# Patient Record
Sex: FEMALE | Race: WHITE | NOT HISPANIC OR LATINO | Employment: UNEMPLOYED | URBAN - METROPOLITAN AREA
[De-identification: names, ages, dates, MRNs, and addresses within clinical notes are randomized per-mention and may not be internally consistent; named-entity substitution may affect disease eponyms.]

---

## 2017-01-01 ENCOUNTER — APPOINTMENT (OUTPATIENT)
Dept: RADIOLOGY | Facility: MEDICAL CENTER | Age: 47
DRG: 023 | End: 2017-01-01
Attending: INTERNAL MEDICINE

## 2017-01-01 ENCOUNTER — APPOINTMENT (OUTPATIENT)
Dept: RADIOLOGY | Facility: MEDICAL CENTER | Age: 47
DRG: 023 | End: 2017-01-01
Attending: EMERGENCY MEDICINE

## 2017-01-01 ENCOUNTER — APPOINTMENT (OUTPATIENT)
Dept: RADIOLOGY | Facility: MEDICAL CENTER | Age: 47
DRG: 023 | End: 2017-01-01
Attending: SURGERY

## 2017-01-01 ENCOUNTER — HOSPITAL ENCOUNTER (INPATIENT)
Facility: MEDICAL CENTER | Age: 47
LOS: 3 days | DRG: 023 | End: 2017-04-22
Attending: EMERGENCY MEDICINE | Admitting: HOSPITALIST

## 2017-01-01 ENCOUNTER — RESOLUTE PROFESSIONAL BILLING HOSPITAL PROF FEE (OUTPATIENT)
Dept: HOSPITALIST | Facility: MEDICAL CENTER | Age: 47
End: 2017-01-01

## 2017-01-01 VITALS
BODY MASS INDEX: 20.8 KG/M2 | OXYGEN SATURATION: 2 % | HEART RATE: 153 BPM | HEIGHT: 67 IN | WEIGHT: 132.5 LBS | TEMPERATURE: 98 F

## 2017-01-01 DIAGNOSIS — I61.9 LEFT-SIDED NONTRAUMATIC INTRACEREBRAL HEMORRHAGE, UNSPECIFIED CEREBRAL LOCATION (HCC): ICD-10-CM

## 2017-01-01 LAB
ABO GROUP BLD: NORMAL
ALBUMIN SERPL BCP-MCNC: 3.4 G/DL (ref 3.2–4.9)
ALBUMIN SERPL BCP-MCNC: 3.7 G/DL (ref 3.2–4.9)
ALBUMIN/GLOB SERPL: 1.4 G/DL
ALBUMIN/GLOB SERPL: 1.4 G/DL
ALP SERPL-CCNC: 107 U/L (ref 30–99)
ALP SERPL-CCNC: 83 U/L (ref 30–99)
ALT SERPL-CCNC: 10 U/L (ref 2–50)
ALT SERPL-CCNC: 13 U/L (ref 2–50)
AMPHET UR QL SCN: POSITIVE
AMPHET UR QL SCN: POSITIVE
ANION GAP SERPL CALC-SCNC: 10 MMOL/L (ref 0–11.9)
ANION GAP SERPL CALC-SCNC: 10 MMOL/L (ref 0–11.9)
ANION GAP SERPL CALC-SCNC: 5 MMOL/L (ref 0–11.9)
ANION GAP SERPL CALC-SCNC: 6 MMOL/L (ref 0–11.9)
ANION GAP SERPL CALC-SCNC: 7 MMOL/L (ref 0–11.9)
ANION GAP SERPL CALC-SCNC: 8 MMOL/L (ref 0–11.9)
APTT PPP: 28 SEC (ref 24.7–36)
AST SERPL-CCNC: 16 U/L (ref 12–45)
AST SERPL-CCNC: 34 U/L (ref 12–45)
BARBITURATES UR QL SCN: NEGATIVE
BARBITURATES UR QL SCN: NEGATIVE
BASE EXCESS BLDA CALC-SCNC: -1 MMOL/L (ref -4–3)
BASE EXCESS BLDA CALC-SCNC: -5 MMOL/L (ref -4–3)
BASE EXCESS BLDA CALC-SCNC: -5 MMOL/L (ref -4–3)
BASE EXCESS BLDA CALC-SCNC: -6 MMOL/L (ref -4–3)
BASE EXCESS BLDA CALC-SCNC: -8 MMOL/L (ref -4–3)
BASOPHILS # BLD AUTO: 0.3 % (ref 0–1.8)
BASOPHILS # BLD AUTO: 0.6 % (ref 0–1.8)
BASOPHILS # BLD: 0.03 K/UL (ref 0–0.12)
BASOPHILS # BLD: 0.09 K/UL (ref 0–0.12)
BENZODIAZ UR QL SCN: NEGATIVE
BENZODIAZ UR QL SCN: POSITIVE
BILIRUB SERPL-MCNC: 0.2 MG/DL (ref 0.1–1.5)
BILIRUB SERPL-MCNC: 0.3 MG/DL (ref 0.1–1.5)
BLD GP AB SCN SERPL QL: NORMAL
BLOOD CULTURE HOLD CXBCH: NORMAL
BODY TEMPERATURE: ABNORMAL DEGREES
BUN SERPL-MCNC: 11 MG/DL (ref 8–22)
BUN SERPL-MCNC: 12 MG/DL (ref 8–22)
BUN SERPL-MCNC: 5 MG/DL (ref 8–22)
BUN SERPL-MCNC: 6 MG/DL (ref 8–22)
BUN SERPL-MCNC: 9 MG/DL (ref 8–22)
BUN SERPL-MCNC: 9 MG/DL (ref 8–22)
BZE UR QL SCN: NEGATIVE
BZE UR QL SCN: NEGATIVE
CALCIUM SERPL-MCNC: 8 MG/DL (ref 8.5–10.5)
CALCIUM SERPL-MCNC: 8.5 MG/DL (ref 8.5–10.5)
CALCIUM SERPL-MCNC: 8.5 MG/DL (ref 8.5–10.5)
CALCIUM SERPL-MCNC: 8.6 MG/DL (ref 8.5–10.5)
CALCIUM SERPL-MCNC: 8.7 MG/DL (ref 8.5–10.5)
CALCIUM SERPL-MCNC: 8.7 MG/DL (ref 8.5–10.5)
CANNABINOIDS UR QL SCN: NEGATIVE
CANNABINOIDS UR QL SCN: NEGATIVE
CHLORIDE SERPL-SCNC: 111 MMOL/L (ref 96–112)
CHLORIDE SERPL-SCNC: 112 MMOL/L (ref 96–112)
CHLORIDE SERPL-SCNC: 114 MMOL/L (ref 96–112)
CHLORIDE SERPL-SCNC: 115 MMOL/L (ref 96–112)
CHLORIDE SERPL-SCNC: 116 MMOL/L (ref 96–112)
CHLORIDE SERPL-SCNC: 117 MMOL/L (ref 96–112)
CO2 BLDA-SCNC: 19 MMOL/L (ref 20–33)
CO2 BLDA-SCNC: 20 MMOL/L (ref 20–33)
CO2 BLDA-SCNC: 21 MMOL/L (ref 20–33)
CO2 BLDA-SCNC: 23 MMOL/L (ref 20–33)
CO2 BLDA-SCNC: 24 MMOL/L (ref 20–33)
CO2 SERPL-SCNC: 17 MMOL/L (ref 20–33)
CO2 SERPL-SCNC: 18 MMOL/L (ref 20–33)
CO2 SERPL-SCNC: 18 MMOL/L (ref 20–33)
CO2 SERPL-SCNC: 20 MMOL/L (ref 20–33)
CO2 SERPL-SCNC: 22 MMOL/L (ref 20–33)
CO2 SERPL-SCNC: 24 MMOL/L (ref 20–33)
CREAT SERPL-MCNC: 0.45 MG/DL (ref 0.5–1.4)
CREAT SERPL-MCNC: 0.49 MG/DL (ref 0.5–1.4)
CREAT SERPL-MCNC: 0.52 MG/DL (ref 0.5–1.4)
CREAT SERPL-MCNC: 0.56 MG/DL (ref 0.5–1.4)
CREAT SERPL-MCNC: 0.58 MG/DL (ref 0.5–1.4)
CREAT SERPL-MCNC: 0.7 MG/DL (ref 0.5–1.4)
EKG IMPRESSION: NORMAL
EOSINOPHIL # BLD AUTO: 0.02 K/UL (ref 0–0.51)
EOSINOPHIL # BLD AUTO: 0.17 K/UL (ref 0–0.51)
EOSINOPHIL NFR BLD: 0.2 % (ref 0–6.9)
EOSINOPHIL NFR BLD: 1.1 % (ref 0–6.9)
ERYTHROCYTE [DISTWIDTH] IN BLOOD BY AUTOMATED COUNT: 43.8 FL (ref 35.9–50)
ERYTHROCYTE [DISTWIDTH] IN BLOOD BY AUTOMATED COUNT: 44.8 FL (ref 35.9–50)
ERYTHROCYTE [DISTWIDTH] IN BLOOD BY AUTOMATED COUNT: 46.7 FL (ref 35.9–50)
ERYTHROCYTE [DISTWIDTH] IN BLOOD BY AUTOMATED COUNT: 46.7 FL (ref 35.9–50)
GFR SERPL CREATININE-BSD FRML MDRD: >60 ML/MIN/1.73 M 2
GLOBULIN SER CALC-MCNC: 2.5 G/DL (ref 1.9–3.5)
GLOBULIN SER CALC-MCNC: 2.7 G/DL (ref 1.9–3.5)
GLUCOSE BLD-MCNC: 111 MG/DL (ref 65–99)
GLUCOSE BLD-MCNC: 124 MG/DL (ref 65–99)
GLUCOSE SERPL-MCNC: 120 MG/DL (ref 65–99)
GLUCOSE SERPL-MCNC: 124 MG/DL (ref 65–99)
GLUCOSE SERPL-MCNC: 125 MG/DL (ref 65–99)
GLUCOSE SERPL-MCNC: 128 MG/DL (ref 65–99)
GLUCOSE SERPL-MCNC: 130 MG/DL (ref 65–99)
GLUCOSE SERPL-MCNC: 235 MG/DL (ref 65–99)
HCO3 BLDA-SCNC: 18.1 MMOL/L (ref 17–25)
HCO3 BLDA-SCNC: 18.7 MMOL/L (ref 17–25)
HCO3 BLDA-SCNC: 19.6 MMOL/L (ref 17–25)
HCO3 BLDA-SCNC: 22.2 MMOL/L (ref 17–25)
HCO3 BLDA-SCNC: 22.5 MMOL/L (ref 17–25)
HCT VFR BLD AUTO: 35.9 % (ref 37–47)
HCT VFR BLD AUTO: 38.4 % (ref 37–47)
HCT VFR BLD AUTO: 39.4 % (ref 37–47)
HCT VFR BLD AUTO: 40.7 % (ref 37–47)
HGB BLD-MCNC: 11.8 G/DL (ref 12–16)
HGB BLD-MCNC: 12.6 G/DL (ref 12–16)
HGB BLD-MCNC: 12.8 G/DL (ref 12–16)
HGB BLD-MCNC: 13.3 G/DL (ref 12–16)
IMM GRANULOCYTES # BLD AUTO: 0.04 K/UL (ref 0–0.11)
IMM GRANULOCYTES # BLD AUTO: 0.11 K/UL (ref 0–0.11)
IMM GRANULOCYTES NFR BLD AUTO: 0.4 % (ref 0–0.9)
IMM GRANULOCYTES NFR BLD AUTO: 0.7 % (ref 0–0.9)
INR PPP: 0.92 (ref 0.87–1.13)
LYMPHOCYTES # BLD AUTO: 1.16 K/UL (ref 1–4.8)
LYMPHOCYTES # BLD AUTO: 4.48 K/UL (ref 1–4.8)
LYMPHOCYTES NFR BLD: 10.2 % (ref 22–41)
LYMPHOCYTES NFR BLD: 29.3 % (ref 22–41)
MAGNESIUM SERPL-MCNC: 1.6 MG/DL (ref 1.5–2.5)
MAGNESIUM SERPL-MCNC: 1.9 MG/DL (ref 1.5–2.5)
MAGNESIUM SERPL-MCNC: 2.1 MG/DL (ref 1.5–2.5)
MCH RBC QN AUTO: 29.4 PG (ref 27–33)
MCH RBC QN AUTO: 29.5 PG (ref 27–33)
MCH RBC QN AUTO: 29.8 PG (ref 27–33)
MCH RBC QN AUTO: 29.8 PG (ref 27–33)
MCHC RBC AUTO-ENTMCNC: 32.5 G/DL (ref 33.6–35)
MCHC RBC AUTO-ENTMCNC: 32.7 G/DL (ref 33.6–35)
MCHC RBC AUTO-ENTMCNC: 32.8 G/DL (ref 33.6–35)
MCHC RBC AUTO-ENTMCNC: 32.9 G/DL (ref 33.6–35)
MCV RBC AUTO: 89.8 FL (ref 81.4–97.8)
MCV RBC AUTO: 89.9 FL (ref 81.4–97.8)
MCV RBC AUTO: 90.7 FL (ref 81.4–97.8)
MCV RBC AUTO: 91.6 FL (ref 81.4–97.8)
MDMA UR QL SCN: NEGATIVE
MDMA UR QL SCN: NEGATIVE
METHADONE UR QL SCN: NEGATIVE
METHADONE UR QL SCN: NEGATIVE
MONOCYTES # BLD AUTO: 0.71 K/UL (ref 0–0.85)
MONOCYTES # BLD AUTO: 0.83 K/UL (ref 0–0.85)
MONOCYTES NFR BLD AUTO: 5.4 % (ref 0–13.4)
MONOCYTES NFR BLD AUTO: 6.3 % (ref 0–13.4)
NEUTROPHILS # BLD AUTO: 9.38 K/UL (ref 2–7.15)
NEUTROPHILS # BLD AUTO: 9.63 K/UL (ref 2–7.15)
NEUTROPHILS NFR BLD: 62.9 % (ref 44–72)
NEUTROPHILS NFR BLD: 82.6 % (ref 44–72)
NRBC # BLD AUTO: 0 K/UL
NRBC # BLD AUTO: 0 K/UL
NRBC BLD AUTO-RTO: 0 /100 WBC
NRBC BLD AUTO-RTO: 0 /100 WBC
O2/TOTAL GAS SETTING VFR VENT: 100 %
O2/TOTAL GAS SETTING VFR VENT: 30 %
O2/TOTAL GAS SETTING VFR VENT: 60 %
OPIATES UR QL SCN: NEGATIVE
OPIATES UR QL SCN: NEGATIVE
OSMOLALITY UR: 99 MOSM/KG H2O (ref 300–900)
OXYCODONE UR QL SCN: NEGATIVE
OXYCODONE UR QL SCN: NEGATIVE
PCO2 BLDA: 30.4 MMHG (ref 26–37)
PCO2 BLDA: 31.7 MMHG (ref 26–37)
PCO2 BLDA: 33.1 MMHG (ref 26–37)
PCO2 BLDA: 36.1 MMHG (ref 26–37)
PCO2 BLDA: 51.3 MMHG (ref 26–37)
PCO2 TEMP ADJ BLDA: 29.9 MMHG (ref 26–37)
PCO2 TEMP ADJ BLDA: 30.6 MMHG (ref 26–37)
PCO2 TEMP ADJ BLDA: 30.8 MMHG (ref 26–37)
PCO2 TEMP ADJ BLDA: 32.8 MMHG (ref 26–37)
PCO2 TEMP ADJ BLDA: 48.4 MMHG (ref 26–37)
PCP UR QL SCN: NEGATIVE
PCP UR QL SCN: NEGATIVE
PH BLDA: 7.25 [PH] (ref 7.4–7.5)
PH BLDA: 7.31 [PH] (ref 7.4–7.5)
PH BLDA: 7.38 [PH] (ref 7.4–7.5)
PH BLDA: 7.38 [PH] (ref 7.4–7.5)
PH BLDA: 7.47 [PH] (ref 7.4–7.5)
PH TEMP ADJ BLDA: 7.27 [PH] (ref 7.4–7.5)
PH TEMP ADJ BLDA: 7.36 [PH] (ref 7.4–7.5)
PH TEMP ADJ BLDA: 7.38 [PH] (ref 7.4–7.5)
PH TEMP ADJ BLDA: 7.39 [PH] (ref 7.4–7.5)
PH TEMP ADJ BLDA: 7.48 [PH] (ref 7.4–7.5)
PHOSPHATE SERPL-MCNC: 2.1 MG/DL (ref 2.5–4.5)
PHOSPHATE SERPL-MCNC: 2.4 MG/DL (ref 2.5–4.5)
PHOSPHATE SERPL-MCNC: 2.6 MG/DL (ref 2.5–4.5)
PLATELET # BLD AUTO: 161 K/UL (ref 164–446)
PLATELET # BLD AUTO: 164 K/UL (ref 164–446)
PLATELET # BLD AUTO: 182 K/UL (ref 164–446)
PLATELET # BLD AUTO: 279 K/UL (ref 164–446)
PMV BLD AUTO: 10.2 FL (ref 9–12.9)
PMV BLD AUTO: 10.3 FL (ref 9–12.9)
PMV BLD AUTO: 10.4 FL (ref 9–12.9)
PMV BLD AUTO: 10.5 FL (ref 9–12.9)
PO2 BLDA: 109 MMHG (ref 64–87)
PO2 BLDA: 337 MMHG (ref 64–87)
PO2 BLDA: 399 MMHG (ref 64–87)
PO2 BLDA: 95 MMHG (ref 64–87)
PO2 BLDA: 97 MMHG (ref 64–87)
PO2 TEMP ADJ BLDA: 107 MMHG (ref 64–87)
PO2 TEMP ADJ BLDA: 318 MMHG (ref 64–87)
PO2 TEMP ADJ BLDA: 392 MMHG (ref 64–87)
PO2 TEMP ADJ BLDA: 92 MMHG (ref 64–87)
PO2 TEMP ADJ BLDA: 92 MMHG (ref 64–87)
POTASSIUM SERPL-SCNC: 3.2 MMOL/L (ref 3.6–5.5)
POTASSIUM SERPL-SCNC: 3.6 MMOL/L (ref 3.6–5.5)
POTASSIUM SERPL-SCNC: 3.7 MMOL/L (ref 3.6–5.5)
POTASSIUM SERPL-SCNC: 3.7 MMOL/L (ref 3.6–5.5)
POTASSIUM SERPL-SCNC: 3.8 MMOL/L (ref 3.6–5.5)
POTASSIUM SERPL-SCNC: 3.9 MMOL/L (ref 3.6–5.5)
PROPOXYPH UR QL SCN: NEGATIVE
PROPOXYPH UR QL SCN: NEGATIVE
PROT SERPL-MCNC: 5.9 G/DL (ref 6–8.2)
PROT SERPL-MCNC: 6.4 G/DL (ref 6–8.2)
PROTHROMBIN TIME: 12.6 SEC (ref 12–14.6)
RBC # BLD AUTO: 3.96 M/UL (ref 4.2–5.4)
RBC # BLD AUTO: 4.27 M/UL (ref 4.2–5.4)
RBC # BLD AUTO: 4.3 M/UL (ref 4.2–5.4)
RBC # BLD AUTO: 4.53 M/UL (ref 4.2–5.4)
RH BLD: NORMAL
SAO2 % BLDA: 100 % (ref 93–99)
SAO2 % BLDA: 100 % (ref 93–99)
SAO2 % BLDA: 97 % (ref 93–99)
SAO2 % BLDA: 98 % (ref 93–99)
SAO2 % BLDA: 98 % (ref 93–99)
SODIUM SERPL-SCNC: 137 MMOL/L (ref 135–145)
SODIUM SERPL-SCNC: 137 MMOL/L (ref 135–145)
SODIUM SERPL-SCNC: 142 MMOL/L (ref 135–145)
SODIUM SERPL-SCNC: 142 MMOL/L (ref 135–145)
SODIUM SERPL-SCNC: 144 MMOL/L (ref 135–145)
SODIUM SERPL-SCNC: 148 MMOL/L (ref 135–145)
SODIUM UR-SCNC: 20 MMOL/L
SP GR UR STRIP.AUTO: 1.02
SPECIMEN DRAWN FROM PATIENT: ABNORMAL
TRIGL SERPL-MCNC: 183 MG/DL (ref 0–149)
TRIGL SERPL-MCNC: 88 MG/DL (ref 0–149)
TROPONIN I SERPL-MCNC: <0.01 NG/ML (ref 0–0.04)
WBC # BLD AUTO: 11.3 K/UL (ref 4.8–10.8)
WBC # BLD AUTO: 11.7 K/UL (ref 4.8–10.8)
WBC # BLD AUTO: 13.1 K/UL (ref 4.8–10.8)
WBC # BLD AUTO: 15.3 K/UL (ref 4.8–10.8)

## 2017-01-01 PROCEDURE — 51702 INSERT TEMP BLADDER CATH: CPT

## 2017-01-01 PROCEDURE — 83735 ASSAY OF MAGNESIUM: CPT

## 2017-01-01 PROCEDURE — 99223 1ST HOSP IP/OBS HIGH 75: CPT | Performed by: HOSPITALIST

## 2017-01-01 PROCEDURE — 82803 BLOOD GASES ANY COMBINATION: CPT

## 2017-01-01 PROCEDURE — 96375 TX/PRO/DX INJ NEW DRUG ADDON: CPT

## 2017-01-01 PROCEDURE — 99291 CRITICAL CARE FIRST HOUR: CPT

## 2017-01-01 PROCEDURE — 36600 WITHDRAWAL OF ARTERIAL BLOOD: CPT

## 2017-01-01 PROCEDURE — 94003 VENT MGMT INPAT SUBQ DAY: CPT

## 2017-01-01 PROCEDURE — 31500 INSERT EMERGENCY AIRWAY: CPT

## 2017-01-01 PROCEDURE — A9270 NON-COVERED ITEM OR SERVICE: HCPCS | Performed by: INTERNAL MEDICINE

## 2017-01-01 PROCEDURE — 700111 HCHG RX REV CODE 636 W/ 250 OVERRIDE (IP)

## 2017-01-01 PROCEDURE — 84484 ASSAY OF TROPONIN QUANT: CPT

## 2017-01-01 PROCEDURE — 80048 BASIC METABOLIC PNL TOTAL CA: CPT

## 2017-01-01 PROCEDURE — 770022 HCHG ROOM/CARE - ICU (200)

## 2017-01-01 PROCEDURE — A6402 STERILE GAUZE <= 16 SQ IN: HCPCS | Performed by: NEUROLOGICAL SURGERY

## 2017-01-01 PROCEDURE — 500303 HCHG CLIP, SCALP: Performed by: NEUROLOGICAL SURGERY

## 2017-01-01 PROCEDURE — 82962 GLUCOSE BLOOD TEST: CPT

## 2017-01-01 PROCEDURE — 83935 ASSAY OF URINE OSMOLALITY: CPT

## 2017-01-01 PROCEDURE — 302136 NUTRITION PUMP: Performed by: NEUROLOGICAL SURGERY

## 2017-01-01 PROCEDURE — 84100 ASSAY OF PHOSPHORUS: CPT

## 2017-01-01 PROCEDURE — A9270 NON-COVERED ITEM OR SERVICE: HCPCS | Performed by: HOSPITALIST

## 2017-01-01 PROCEDURE — 500122 HCHG BOVIE, BLADE: Performed by: NEUROLOGICAL SURGERY

## 2017-01-01 PROCEDURE — 36415 COLL VENOUS BLD VENIPUNCTURE: CPT

## 2017-01-01 PROCEDURE — 501445 HCHG STAPLER, SKIN DISP: Performed by: NEUROLOGICAL SURGERY

## 2017-01-01 PROCEDURE — 500912 HCHG PERFORATOR, DISP TIP: Performed by: NEUROLOGICAL SURGERY

## 2017-01-01 PROCEDURE — 99233 SBSQ HOSP IP/OBS HIGH 50: CPT | Performed by: HOSPITALIST

## 2017-01-01 PROCEDURE — 700117 HCHG RX CONTRAST REV CODE 255: Performed by: EMERGENCY MEDICINE

## 2017-01-01 PROCEDURE — 72125 CT NECK SPINE W/O DYE: CPT

## 2017-01-01 PROCEDURE — 700111 HCHG RX REV CODE 636 W/ 250 OVERRIDE (IP): Performed by: EMERGENCY MEDICINE

## 2017-01-01 PROCEDURE — 85025 COMPLETE CBC W/AUTO DIFF WBC: CPT

## 2017-01-01 PROCEDURE — 700111 HCHG RX REV CODE 636 W/ 250 OVERRIDE (IP): Performed by: INTERNAL MEDICINE

## 2017-01-01 PROCEDURE — 99291 CRITICAL CARE FIRST HOUR: CPT | Performed by: INTERNAL MEDICINE

## 2017-01-01 PROCEDURE — 502626 HCHG SURGIFLO HEMOSTATIC MATRIX 6ML: Performed by: NEUROLOGICAL SURGERY

## 2017-01-01 PROCEDURE — 85027 COMPLETE CBC AUTOMATED: CPT

## 2017-01-01 PROCEDURE — 160041 HCHG SURGERY MINUTES - EA ADDL 1 MIN LEVEL 4: Performed by: NEUROLOGICAL SURGERY

## 2017-01-01 PROCEDURE — 71010 DX-CHEST-PORTABLE (1 VIEW): CPT

## 2017-01-01 PROCEDURE — 700101 HCHG RX REV CODE 250

## 2017-01-01 PROCEDURE — 501422 HCHG SPONGE, SURGIFOAM 100: Performed by: NEUROLOGICAL SURGERY

## 2017-01-01 PROCEDURE — 700105 HCHG RX REV CODE 258: Performed by: HOSPITALIST

## 2017-01-01 PROCEDURE — 500367 HCHG DRAIN KIT, HEMOVAC: Performed by: NEUROLOGICAL SURGERY

## 2017-01-01 PROCEDURE — 110371 HCHG SHELL REV 272: Performed by: NEUROLOGICAL SURGERY

## 2017-01-01 PROCEDURE — 700101 HCHG RX REV CODE 250: Performed by: NEUROLOGICAL SURGERY

## 2017-01-01 PROCEDURE — 160048 HCHG OR STATISTICAL LEVEL 1-5: Performed by: NEUROLOGICAL SURGERY

## 2017-01-01 PROCEDURE — A4606 OXYGEN PROBE USED W OXIMETER: HCPCS | Performed by: NEUROLOGICAL SURGERY

## 2017-01-01 PROCEDURE — 96368 THER/DIAG CONCURRENT INF: CPT

## 2017-01-01 PROCEDURE — 500394: Performed by: NEUROLOGICAL SURGERY

## 2017-01-01 PROCEDURE — 303105 HCHG CATHETER EXTRA

## 2017-01-01 PROCEDURE — 700102 HCHG RX REV CODE 250 W/ 637 OVERRIDE(OP): Performed by: HOSPITALIST

## 2017-01-01 PROCEDURE — 94770 HCHG CO2 EXPIRED GAS DETERMINATION: CPT

## 2017-01-01 PROCEDURE — 70496 CT ANGIOGRAPHY HEAD: CPT

## 2017-01-01 PROCEDURE — 700102 HCHG RX REV CODE 250 W/ 637 OVERRIDE(OP): Performed by: INTERNAL MEDICINE

## 2017-01-01 PROCEDURE — 81002 URINALYSIS NONAUTO W/O SCOPE: CPT

## 2017-01-01 PROCEDURE — 80053 COMPREHEN METABOLIC PANEL: CPT

## 2017-01-01 PROCEDURE — 86901 BLOOD TYPING SEROLOGIC RH(D): CPT

## 2017-01-01 PROCEDURE — 37799 UNLISTED PX VASCULAR SURGERY: CPT

## 2017-01-01 PROCEDURE — 74000 DX-ABDOMEN-1 VIEW: CPT

## 2017-01-01 PROCEDURE — 93005 ELECTROCARDIOGRAM TRACING: CPT | Performed by: EMERGENCY MEDICINE

## 2017-01-01 PROCEDURE — 5A1945Z RESPIRATORY VENTILATION, 24-96 CONSECUTIVE HOURS: ICD-10-PCS | Performed by: EMERGENCY MEDICINE

## 2017-01-01 PROCEDURE — 96366 THER/PROPH/DIAG IV INF ADDON: CPT

## 2017-01-01 PROCEDURE — 84478 ASSAY OF TRIGLYCERIDES: CPT

## 2017-01-01 PROCEDURE — 160009 HCHG ANES TIME/MIN: Performed by: NEUROLOGICAL SURGERY

## 2017-01-01 PROCEDURE — 302214 INTUBATION BOX: Performed by: EMERGENCY MEDICINE

## 2017-01-01 PROCEDURE — 500889 HCHG PACK, NEURO: Performed by: NEUROLOGICAL SURGERY

## 2017-01-01 PROCEDURE — 84300 ASSAY OF URINE SODIUM: CPT

## 2017-01-01 PROCEDURE — A9270 NON-COVERED ITEM OR SERVICE: HCPCS

## 2017-01-01 PROCEDURE — 700102 HCHG RX REV CODE 250 W/ 637 OVERRIDE(OP): Performed by: NEUROLOGICAL SURGERY

## 2017-01-01 PROCEDURE — 96365 THER/PROPH/DIAG IV INF INIT: CPT

## 2017-01-01 PROCEDURE — 500448 HCHG DRESSING, TELFA 3X4: Performed by: NEUROLOGICAL SURGERY

## 2017-01-01 PROCEDURE — 160029 HCHG SURGERY MINUTES - 1ST 30 MINS LEVEL 4: Performed by: NEUROLOGICAL SURGERY

## 2017-01-01 PROCEDURE — 304538 HCHG NG TUBE

## 2017-01-01 PROCEDURE — 502240 HCHG MISC OR SUPPLY RC 0272: Performed by: NEUROLOGICAL SURGERY

## 2017-01-01 PROCEDURE — 700102 HCHG RX REV CODE 250 W/ 637 OVERRIDE(OP)

## 2017-01-01 PROCEDURE — 0BH18EZ INSERTION OF ENDOTRACHEAL AIRWAY INTO TRACHEA, VIA NATURAL OR ARTIFICIAL OPENING ENDOSCOPIC: ICD-10-PCS | Performed by: EMERGENCY MEDICINE

## 2017-01-01 PROCEDURE — 700105 HCHG RX REV CODE 258: Performed by: EMERGENCY MEDICINE

## 2017-01-01 PROCEDURE — 500393 HCHG DRAIN, VENTRIC CATH: Performed by: NEUROLOGICAL SURGERY

## 2017-01-01 PROCEDURE — 85730 THROMBOPLASTIN TIME PARTIAL: CPT

## 2017-01-01 PROCEDURE — 501423 HCHG SPONGE, SURGIFOAM 12X7: Performed by: NEUROLOGICAL SURGERY

## 2017-01-01 PROCEDURE — 80307 DRUG TEST PRSMV CHEM ANLYZR: CPT

## 2017-01-01 PROCEDURE — 700101 HCHG RX REV CODE 250: Performed by: EMERGENCY MEDICINE

## 2017-01-01 PROCEDURE — 110382 HCHG SHELL REV 271: Performed by: NEUROLOGICAL SURGERY

## 2017-01-01 PROCEDURE — 500075 HCHG BLADE, CLIPPER NEURO: Performed by: NEUROLOGICAL SURGERY

## 2017-01-01 PROCEDURE — 86850 RBC ANTIBODY SCREEN: CPT

## 2017-01-01 PROCEDURE — 86900 BLOOD TYPING SEROLOGIC ABO: CPT

## 2017-01-01 PROCEDURE — 700101 HCHG RX REV CODE 250: Performed by: HOSPITALIST

## 2017-01-01 PROCEDURE — E0191 PROTECTOR HEEL OR ELBOW: HCPCS | Performed by: INTERNAL MEDICINE

## 2017-01-01 PROCEDURE — 009630Z DRAINAGE OF CEREBRAL VENTRICLE WITH DRAINAGE DEVICE, PERCUTANEOUS APPROACH: ICD-10-PCS | Performed by: NEUROLOGICAL SURGERY

## 2017-01-01 PROCEDURE — 700111 HCHG RX REV CODE 636 W/ 250 OVERRIDE (IP): Performed by: HOSPITALIST

## 2017-01-01 PROCEDURE — 501838 HCHG SUTURE GENERAL: Performed by: NEUROLOGICAL SURGERY

## 2017-01-01 PROCEDURE — 500440 HCHG DRESSING, STERILE ROLL (KERLIX): Performed by: NEUROLOGICAL SURGERY

## 2017-01-01 PROCEDURE — 94002 VENT MGMT INPAT INIT DAY: CPT

## 2017-01-01 PROCEDURE — 85610 PROTHROMBIN TIME: CPT

## 2017-01-01 PROCEDURE — 70450 CT HEAD/BRAIN W/O DYE: CPT

## 2017-01-01 PROCEDURE — 500654 HCHG GRAFT, MESH SEPRAFILM: Performed by: NEUROLOGICAL SURGERY

## 2017-01-01 PROCEDURE — A9270 NON-COVERED ITEM OR SERVICE: HCPCS | Performed by: NEUROLOGICAL SURGERY

## 2017-01-01 DEVICE — CATHETER DRN 35CM 2.9MM 1.6MM LG HERMETIC LARGE-STYLE - SUB ITEM USE #4384: Type: IMPLANTABLE DEVICE | Status: FUNCTIONAL

## 2017-01-01 RX ORDER — SODIUM CHLORIDE 9 MG/ML
1000 INJECTION, SOLUTION INTRAVENOUS ONCE
Status: COMPLETED | OUTPATIENT
Start: 2017-01-01 | End: 2017-01-01

## 2017-01-01 RX ORDER — ETOMIDATE 2 MG/ML
20 INJECTION INTRAVENOUS ONCE
Status: COMPLETED | OUTPATIENT
Start: 2017-01-01 | End: 2017-01-01

## 2017-01-01 RX ORDER — AMOXICILLIN 250 MG
2 CAPSULE ORAL 2 TIMES DAILY
Status: DISCONTINUED | OUTPATIENT
Start: 2017-01-01 | End: 2017-01-01

## 2017-01-01 RX ORDER — LABETALOL HYDROCHLORIDE 5 MG/ML
10 INJECTION, SOLUTION INTRAVENOUS
Status: DISCONTINUED | OUTPATIENT
Start: 2017-01-01 | End: 2017-01-01

## 2017-01-01 RX ORDER — BISACODYL 10 MG
10 SUPPOSITORY, RECTAL RECTAL
Status: DISCONTINUED | OUTPATIENT
Start: 2017-01-01 | End: 2017-01-01

## 2017-01-01 RX ORDER — ENEMA 19; 7 G/133ML; G/133ML
1 ENEMA RECTAL
Status: DISCONTINUED | OUTPATIENT
Start: 2017-01-01 | End: 2017-01-01

## 2017-01-01 RX ORDER — LEVOTHYROXINE SODIUM 0.05 MG/1
50 TABLET ORAL DAILY
Status: DISCONTINUED | OUTPATIENT
Start: 2017-01-01 | End: 2017-01-01

## 2017-01-01 RX ORDER — OXYCODONE HYDROCHLORIDE 5 MG/1
5 TABLET ORAL
Status: DISCONTINUED | OUTPATIENT
Start: 2017-01-01 | End: 2017-04-23 | Stop reason: HOSPADM

## 2017-01-01 RX ORDER — OXYCODONE HYDROCHLORIDE 5 MG/1
15 TABLET ORAL EVERY 4 HOURS PRN
Status: DISCONTINUED | OUTPATIENT
Start: 2017-01-01 | End: 2017-01-01

## 2017-01-01 RX ORDER — OXYCODONE HYDROCHLORIDE 5 MG/1
2.5 TABLET ORAL
Status: DISCONTINUED | OUTPATIENT
Start: 2017-01-01 | End: 2017-04-23 | Stop reason: HOSPADM

## 2017-01-01 RX ORDER — SODIUM CHLORIDE 9 MG/ML
INJECTION, SOLUTION INTRAVENOUS
Status: DISCONTINUED | OUTPATIENT
Start: 2017-01-01 | End: 2017-01-01

## 2017-01-01 RX ORDER — HYDRALAZINE HYDROCHLORIDE 20 MG/ML
20 INJECTION INTRAMUSCULAR; INTRAVENOUS EVERY 4 HOURS PRN
Status: DISCONTINUED | OUTPATIENT
Start: 2017-01-01 | End: 2017-01-01

## 2017-01-01 RX ORDER — LIDOCAINE HYDROCHLORIDE 10 MG/ML
1-2 INJECTION, SOLUTION INFILTRATION; PERINEURAL
Status: DISCONTINUED | OUTPATIENT
Start: 2017-01-01 | End: 2017-01-01

## 2017-01-01 RX ORDER — POTASSIUM CHLORIDE 1.5 G/1.58G
20 POWDER, FOR SOLUTION ORAL 2 TIMES DAILY
Status: DISCONTINUED | OUTPATIENT
Start: 2017-01-01 | End: 2017-01-01

## 2017-01-01 RX ORDER — SUMATRIPTAN 100 MG/1
100 TABLET, FILM COATED ORAL
COMMUNITY

## 2017-01-01 RX ORDER — DEXTROSE MONOHYDRATE, SODIUM CHLORIDE, AND POTASSIUM CHLORIDE 50; 1.49; 9 G/1000ML; G/1000ML; G/1000ML
INJECTION, SOLUTION INTRAVENOUS CONTINUOUS
Status: DISCONTINUED | OUTPATIENT
Start: 2017-01-01 | End: 2017-01-01

## 2017-01-01 RX ORDER — POLYETHYLENE GLYCOL 3350 17 G/17G
1 POWDER, FOR SOLUTION ORAL
Status: DISCONTINUED | OUTPATIENT
Start: 2017-01-01 | End: 2017-01-01

## 2017-01-01 RX ORDER — CHLORHEXIDINE GLUCONATE ORAL RINSE 1.2 MG/ML
15 SOLUTION DENTAL 2 TIMES DAILY
Status: DISCONTINUED | OUTPATIENT
Start: 2017-01-01 | End: 2017-01-01

## 2017-01-01 RX ORDER — DULOXETIN HYDROCHLORIDE 60 MG/1
60 CAPSULE, DELAYED RELEASE ORAL 2 TIMES DAILY
Status: DISCONTINUED | OUTPATIENT
Start: 2017-01-01 | End: 2017-01-01

## 2017-01-01 RX ORDER — POTASSIUM CHLORIDE 1.5 G/1.58G
20 POWDER, FOR SOLUTION ORAL ONCE
Status: COMPLETED | OUTPATIENT
Start: 2017-01-01 | End: 2017-01-01

## 2017-01-01 RX ORDER — ONDANSETRON 4 MG/1
4 TABLET, ORALLY DISINTEGRATING ORAL EVERY 4 HOURS PRN
Status: DISCONTINUED | OUTPATIENT
Start: 2017-01-01 | End: 2017-01-01

## 2017-01-01 RX ORDER — MIDAZOLAM HYDROCHLORIDE 1 MG/ML
1-5 INJECTION INTRAMUSCULAR; INTRAVENOUS
Status: DISCONTINUED | OUTPATIENT
Start: 2017-01-01 | End: 2017-01-01

## 2017-01-01 RX ORDER — SODIUM CHLORIDE 9 MG/ML
INJECTION, SOLUTION INTRAVENOUS CONTINUOUS
Status: DISCONTINUED | OUTPATIENT
Start: 2017-01-01 | End: 2017-01-01

## 2017-01-01 RX ORDER — ROCURONIUM BROMIDE 10 MG/ML
100 INJECTION, SOLUTION INTRAVENOUS ONCE
Status: COMPLETED | OUTPATIENT
Start: 2017-01-01 | End: 2017-01-01

## 2017-01-01 RX ORDER — MORPHINE SULFATE 10 MG/ML
5-10 INJECTION, SOLUTION INTRAMUSCULAR; INTRAVENOUS
Status: DISCONTINUED | OUTPATIENT
Start: 2017-01-01 | End: 2017-04-23 | Stop reason: HOSPADM

## 2017-01-01 RX ORDER — PROMETHAZINE HYDROCHLORIDE 25 MG/1
12.5-25 TABLET ORAL EVERY 4 HOURS PRN
Status: DISCONTINUED | OUTPATIENT
Start: 2017-01-01 | End: 2017-04-23 | Stop reason: HOSPADM

## 2017-01-01 RX ORDER — PROMETHAZINE HYDROCHLORIDE 25 MG/1
12.5-25 SUPPOSITORY RECTAL EVERY 4 HOURS PRN
Status: DISCONTINUED | OUTPATIENT
Start: 2017-01-01 | End: 2017-04-23 | Stop reason: HOSPADM

## 2017-01-01 RX ORDER — ACETAMINOPHEN 650 MG/1
975 SUPPOSITORY RECTAL EVERY 6 HOURS
Status: DISCONTINUED | OUTPATIENT
Start: 2017-01-01 | End: 2017-04-23 | Stop reason: HOSPADM

## 2017-01-01 RX ORDER — CLINDAMYCIN PHOSPHATE 600 MG/50ML
600 INJECTION, SOLUTION INTRAVENOUS EVERY 8 HOURS
Status: COMPLETED | OUTPATIENT
Start: 2017-01-01 | End: 2017-01-01

## 2017-01-01 RX ORDER — LORAZEPAM 2 MG/ML
5 INJECTION INTRAMUSCULAR EVERY 4 HOURS PRN
Status: DISCONTINUED | OUTPATIENT
Start: 2017-01-01 | End: 2017-04-23 | Stop reason: HOSPADM

## 2017-01-01 RX ORDER — LACTULOSE 20 G/30ML
30 SOLUTION ORAL
Status: DISCONTINUED | OUTPATIENT
Start: 2017-01-01 | End: 2017-01-01

## 2017-01-01 RX ORDER — LORAZEPAM 2 MG/ML
1-2 INJECTION INTRAMUSCULAR EVERY 4 HOURS PRN
Status: DISCONTINUED | OUTPATIENT
Start: 2017-01-01 | End: 2017-04-23 | Stop reason: HOSPADM

## 2017-01-01 RX ORDER — ACETAMINOPHEN 325 MG/1
650 TABLET ORAL EVERY 6 HOURS PRN
Status: DISCONTINUED | OUTPATIENT
Start: 2017-01-01 | End: 2017-04-23 | Stop reason: HOSPADM

## 2017-01-01 RX ORDER — MAGNESIUM SULFATE HEPTAHYDRATE 40 MG/ML
2 INJECTION, SOLUTION INTRAVENOUS ONCE
Status: COMPLETED | OUTPATIENT
Start: 2017-01-01 | End: 2017-01-01

## 2017-01-01 RX ORDER — ONDANSETRON 2 MG/ML
4 INJECTION INTRAMUSCULAR; INTRAVENOUS EVERY 4 HOURS PRN
Status: DISCONTINUED | OUTPATIENT
Start: 2017-01-01 | End: 2017-01-01

## 2017-01-01 RX ORDER — ONDANSETRON 4 MG/1
4 TABLET, ORALLY DISINTEGRATING ORAL EVERY 8 HOURS PRN
Status: DISCONTINUED | OUTPATIENT
Start: 2017-01-01 | End: 2017-04-23 | Stop reason: HOSPADM

## 2017-01-01 RX ORDER — POLYVINYL ALCOHOL 14 MG/ML
2 SOLUTION/ DROPS OPHTHALMIC EVERY 6 HOURS PRN
Status: DISCONTINUED | OUTPATIENT
Start: 2017-01-01 | End: 2017-04-23 | Stop reason: HOSPADM

## 2017-01-01 RX ORDER — AMOXICILLIN 250 MG
2 CAPSULE ORAL DAILY
Status: DISCONTINUED | OUTPATIENT
Start: 2017-01-01 | End: 2017-01-01

## 2017-01-01 RX ORDER — ONDANSETRON 2 MG/ML
4 INJECTION INTRAMUSCULAR; INTRAVENOUS EVERY 8 HOURS PRN
Status: DISCONTINUED | OUTPATIENT
Start: 2017-01-01 | End: 2017-04-23 | Stop reason: HOSPADM

## 2017-01-01 RX ORDER — ATROPINE SULFATE 10 MG/ML
2-4 SOLUTION/ DROPS OPHTHALMIC
Status: DISCONTINUED | OUTPATIENT
Start: 2017-01-01 | End: 2017-04-23 | Stop reason: HOSPADM

## 2017-01-01 RX ORDER — TRAZODONE HYDROCHLORIDE 100 MG/1
200 TABLET ORAL NIGHTLY PRN
Status: DISCONTINUED | OUTPATIENT
Start: 2017-01-01 | End: 2017-01-01

## 2017-01-01 RX ORDER — ARIPIPRAZOLE 10 MG/1
10 TABLET ORAL DAILY
Status: DISCONTINUED | OUTPATIENT
Start: 2017-01-01 | End: 2017-01-01

## 2017-01-01 RX ORDER — ONDANSETRON 2 MG/ML
8 INJECTION INTRAMUSCULAR; INTRAVENOUS EVERY 8 HOURS PRN
Status: DISCONTINUED | OUTPATIENT
Start: 2017-01-01 | End: 2017-04-23 | Stop reason: HOSPADM

## 2017-01-01 RX ORDER — MORPHINE SULFATE 4 MG/ML
2 INJECTION, SOLUTION INTRAMUSCULAR; INTRAVENOUS
Status: DISCONTINUED | OUTPATIENT
Start: 2017-01-01 | End: 2017-01-01

## 2017-01-01 RX ORDER — FAMOTIDINE 20 MG/1
20 TABLET, FILM COATED ORAL EVERY 12 HOURS
Status: DISCONTINUED | OUTPATIENT
Start: 2017-01-01 | End: 2017-01-01

## 2017-01-01 RX ORDER — LORAZEPAM 2 MG/ML
3-4 INJECTION INTRAMUSCULAR EVERY 4 HOURS PRN
Status: DISCONTINUED | OUTPATIENT
Start: 2017-01-01 | End: 2017-04-23 | Stop reason: HOSPADM

## 2017-01-01 RX ORDER — ONDANSETRON 4 MG/1
8 TABLET, ORALLY DISINTEGRATING ORAL EVERY 8 HOURS PRN
Status: DISCONTINUED | OUTPATIENT
Start: 2017-01-01 | End: 2017-04-23 | Stop reason: HOSPADM

## 2017-01-01 RX ADMIN — MINERAL OIL AND WHITE PETROLATUM 1 APPLICATION: 150; 830 OINTMENT OPHTHALMIC at 06:04

## 2017-01-01 RX ADMIN — SODIUM CHLORIDE: 9 INJECTION, SOLUTION INTRAVENOUS at 14:42

## 2017-01-01 RX ADMIN — MINERAL OIL AND WHITE PETROLATUM 1 APPLICATION: 150; 830 OINTMENT OPHTHALMIC at 21:58

## 2017-01-01 RX ADMIN — POTASSIUM CHLORIDE 20 MEQ: 1.5 POWDER, FOR SOLUTION ORAL at 07:56

## 2017-01-01 RX ADMIN — FAMOTIDINE 20 MG: 20 TABLET, FILM COATED ORAL at 21:58

## 2017-01-01 RX ADMIN — PROPOFOL 40 MCG/KG/MIN: 10 INJECTION, EMULSION INTRAVENOUS at 12:05

## 2017-01-01 RX ADMIN — FENTANYL CITRATE 100 MCG: 50 INJECTION INTRAMUSCULAR; INTRAVENOUS at 05:00

## 2017-01-01 RX ADMIN — FAMOTIDINE 20 MG: 10 INJECTION INTRAVENOUS at 20:47

## 2017-01-01 RX ADMIN — SODIUM CHLORIDE 1000 ML: 9 INJECTION, SOLUTION INTRAVENOUS at 18:53

## 2017-01-01 RX ADMIN — FAMOTIDINE 20 MG: 20 TABLET, FILM COATED ORAL at 07:56

## 2017-01-01 RX ADMIN — CHLORHEXIDINE GLUCONATE 15 ML: 1.2 RINSE ORAL at 08:16

## 2017-01-01 RX ADMIN — PROPOFOL 40 MCG/KG/MIN: 10 INJECTION, EMULSION INTRAVENOUS at 07:26

## 2017-01-01 RX ADMIN — STANDARDIZED SENNA CONCENTRATE AND DOCUSATE SODIUM 2 TABLET: 8.6; 5 TABLET, FILM COATED ORAL at 21:57

## 2017-01-01 RX ADMIN — MINERAL OIL AND WHITE PETROLATUM 1 APPLICATION: 150; 830 OINTMENT OPHTHALMIC at 13:13

## 2017-01-01 RX ADMIN — DULOXETINE HYDROCHLORIDE 60 MG: 60 CAPSULE, DELAYED RELEASE ORAL at 20:53

## 2017-01-01 RX ADMIN — MINERAL OIL AND WHITE PETROLATUM 1 APPLICATION: 150; 830 OINTMENT OPHTHALMIC at 15:04

## 2017-01-01 RX ADMIN — PROPOFOL 40 MCG/KG/MIN: 10 INJECTION, EMULSION INTRAVENOUS at 17:41

## 2017-01-01 RX ADMIN — CLINDAMYCIN IN 5 PERCENT DEXTROSE 600 MG: 12 INJECTION, SOLUTION INTRAVENOUS at 19:52

## 2017-01-01 RX ADMIN — PROPOFOL 20 MCG/KG/MIN: 10 INJECTION, EMULSION INTRAVENOUS at 05:55

## 2017-01-01 RX ADMIN — CHLORHEXIDINE GLUCONATE 15 ML: 1.2 RINSE ORAL at 20:47

## 2017-01-01 RX ADMIN — MORPHINE SULFATE 5 MG: 10 INJECTION INTRAVENOUS at 18:35

## 2017-01-01 RX ADMIN — MINERAL OIL AND WHITE PETROLATUM 1 APPLICATION: 150; 830 OINTMENT OPHTHALMIC at 06:10

## 2017-01-01 RX ADMIN — POTASSIUM CHLORIDE 20 MEQ: 1.5 POWDER, FOR SOLUTION ORAL at 08:16

## 2017-01-01 RX ADMIN — FENTANYL CITRATE 100 MCG: 50 INJECTION INTRAMUSCULAR; INTRAVENOUS at 02:48

## 2017-01-01 RX ADMIN — PROPOFOL 20 MCG/KG/MIN: 10 INJECTION, EMULSION INTRAVENOUS at 13:50

## 2017-01-01 RX ADMIN — FAMOTIDINE 20 MG: 20 TABLET, FILM COATED ORAL at 20:52

## 2017-01-01 RX ADMIN — MAGNESIUM SULFATE IN WATER 2 G: 40 INJECTION, SOLUTION INTRAVENOUS at 07:26

## 2017-01-01 RX ADMIN — SODIUM CHLORIDE: 9 INJECTION, SOLUTION INTRAVENOUS at 23:45

## 2017-01-01 RX ADMIN — MINERAL OIL AND WHITE PETROLATUM 1 APPLICATION: 150; 830 OINTMENT OPHTHALMIC at 13:54

## 2017-01-01 RX ADMIN — PROPOFOL 40 MCG/KG/MIN: 10 INJECTION, EMULSION INTRAVENOUS at 20:01

## 2017-01-01 RX ADMIN — CHLORHEXIDINE GLUCONATE 15 ML: 1.2 RINSE ORAL at 20:53

## 2017-01-01 RX ADMIN — PROPOFOL 40 MCG/KG/MIN: 10 INJECTION, EMULSION INTRAVENOUS at 13:13

## 2017-01-01 RX ADMIN — STANDARDIZED SENNA CONCENTRATE AND DOCUSATE SODIUM 2 TABLET: 8.6; 5 TABLET, FILM COATED ORAL at 08:15

## 2017-01-01 RX ADMIN — PROPOFOL 20 MCG/KG/MIN: 10 INJECTION, EMULSION INTRAVENOUS at 20:16

## 2017-01-01 RX ADMIN — PROPOFOL 40 MCG/KG/MIN: 10 INJECTION, EMULSION INTRAVENOUS at 04:33

## 2017-01-01 RX ADMIN — FENTANYL CITRATE 100 MCG: 50 INJECTION INTRAMUSCULAR; INTRAVENOUS at 23:15

## 2017-01-01 RX ADMIN — POTASSIUM CHLORIDE, DEXTROSE MONOHYDRATE AND SODIUM CHLORIDE: 150; 5; 900 INJECTION, SOLUTION INTRAVENOUS at 18:44

## 2017-01-01 RX ADMIN — IOHEXOL 100 ML: 350 INJECTION, SOLUTION INTRAVENOUS at 14:49

## 2017-01-01 RX ADMIN — FENTANYL CITRATE 50 MCG: 50 INJECTION INTRAMUSCULAR; INTRAVENOUS at 00:50

## 2017-01-01 RX ADMIN — PROPOFOL 40 MCG/KG/MIN: 10 INJECTION, EMULSION INTRAVENOUS at 00:38

## 2017-01-01 RX ADMIN — MORPHINE SULFATE 10 MG: 10 INJECTION INTRAVENOUS at 21:06

## 2017-01-01 RX ADMIN — ETOMIDATE 20 MG: 2 INJECTION, SOLUTION INTRAVENOUS at 13:38

## 2017-01-01 RX ADMIN — CLINDAMYCIN IN 5 PERCENT DEXTROSE 600 MG: 12 INJECTION, SOLUTION INTRAVENOUS at 04:23

## 2017-01-01 RX ADMIN — CHLORHEXIDINE GLUCONATE 15 ML: 1.2 RINSE ORAL at 21:57

## 2017-01-01 RX ADMIN — ROCURONIUM BROMIDE 100 MG: 10 INJECTION INTRAVENOUS at 13:39

## 2017-01-01 RX ADMIN — STANDARDIZED SENNA CONCENTRATE AND DOCUSATE SODIUM 2 TABLET: 8.6; 5 TABLET, FILM COATED ORAL at 07:56

## 2017-01-01 RX ADMIN — SODIUM CHLORIDE 2.5 MG/HR: 9 INJECTION, SOLUTION INTRAVENOUS at 14:49

## 2017-01-01 RX ADMIN — CHLORHEXIDINE GLUCONATE 15 ML: 1.2 RINSE ORAL at 07:56

## 2017-01-01 RX ADMIN — SODIUM PHOSPHATE, MONOBASIC, MONOHYDRATE AND SODIUM PHOSPHATE, DIBASIC, ANHYDROUS 15 MMOL: 276; 142 INJECTION, SOLUTION INTRAVENOUS at 08:16

## 2017-01-01 RX ADMIN — FAMOTIDINE 20 MG: 10 INJECTION INTRAVENOUS at 08:12

## 2017-01-01 RX ADMIN — MAGNESIUM SULFATE IN WATER 2 G: 40 INJECTION, SOLUTION INTRAVENOUS at 08:11

## 2017-01-01 RX ADMIN — PROPOFOL 40 MCG/KG/MIN: 10 INJECTION, EMULSION INTRAVENOUS at 12:11

## 2017-01-01 RX ADMIN — MINERAL OIL AND WHITE PETROLATUM 1 APPLICATION: 150; 830 OINTMENT OPHTHALMIC at 22:55

## 2017-01-01 RX ADMIN — SODIUM CHLORIDE: 9 INJECTION, SOLUTION INTRAVENOUS at 08:11

## 2017-01-01 RX ADMIN — SODIUM PHOSPHATE, MONOBASIC, MONOHYDRATE AND SODIUM PHOSPHATE, DIBASIC, ANHYDROUS 30 MMOL: 276; 142 INJECTION, SOLUTION INTRAVENOUS at 07:56

## 2017-01-01 RX ADMIN — STANDARDIZED SENNA CONCENTRATE AND DOCUSATE SODIUM 2 TABLET: 8.6; 5 TABLET, FILM COATED ORAL at 20:52

## 2017-01-01 RX ADMIN — CHLORHEXIDINE GLUCONATE 15 ML: 1.2 RINSE ORAL at 08:12

## 2017-01-01 RX ADMIN — FENTANYL CITRATE 100 MCG: 50 INJECTION INTRAMUSCULAR; INTRAVENOUS at 13:50

## 2017-01-01 RX ADMIN — LABETALOL HYDROCHLORIDE 10 MG: 5 INJECTION INTRAVENOUS at 14:35

## 2017-01-01 RX ADMIN — FAMOTIDINE 20 MG: 20 TABLET, FILM COATED ORAL at 08:15

## 2017-01-01 RX ADMIN — MINERAL OIL AND WHITE PETROLATUM 1 APPLICATION: 150; 830 OINTMENT OPHTHALMIC at 20:48

## 2017-01-01 ASSESSMENT — LIFESTYLE VARIABLES: DO YOU DRINK ALCOHOL: NO

## 2017-04-19 PROBLEM — I61.5 INTRAVENTRICULAR HEMORRHAGE (HCC): Status: ACTIVE | Noted: 2017-01-01

## 2017-04-19 PROBLEM — I61.9 CEREBRAL HEMORRHAGE (HCC): Status: ACTIVE | Noted: 2017-01-01

## 2017-04-19 NOTE — ED PROVIDER NOTES
ED Provider Note    CHIEF COMPLAINT  Chief Complaint   Patient presents with   • ALOC       HPI  Marly Vega is a 46 y.o. female who presents with altered mental status. Patient was at her cousin's house and suddenly collapsed. She was unresponsive. EMS was notified. They found the patient with posturing. She was given 2 mg of Narcan without change. Blood sugar was acceptable. She is transported here with active back valve mask ventilation. I am unable to obtain any history from the patient.    REVIEW OF SYSTEMS  Unable to obtain    PAST MEDICAL HISTORY  Past Medical History   Diagnosis Date   • Psychiatric disorder    • ASTHMA    • Depression    • Dental disorder    • Unspecified disorder of thyroid        SOCIAL HISTORY  Social History   Substance Use Topics   • Smoking status: Current Every Day Smoker -- 0.50 packs/day for 30 years     Types: Cigarettes   • Smokeless tobacco: Not on file      Comment: 0.5ppd   • Alcohol Use: No       SURGICAL HISTORY  Past Surgical History   Procedure Laterality Date   • Tonsillectomy     • Other abdominal surgery          • Exploratory laparotomy       for ovarian cysts    • Melody by laparoscopy  3/12/2011     Performed by JOSE NUÑEZ at [facility]   • Hysterectomy laparoscopy     • Hernia repair     • Laparoscopy  2013     Performed by Jose Nuñez M.D. at [facility]   • Laparoscopy  2014     Performed by Jose Nuñez M.D. at [facility]   • Exploratory laparotomy  2014     Performed by Jose Nuñez M.D. at [facility]   • Appendectomy  2014     Performed by Jose Nuñez M.D. at [facility]       CURRENT MEDICATIONS  Home Medications     **Home medications have not yet been reviewed for this encounter**          ALLERGIES  Allergies   Allergen Reactions   • Amoxicillin    • Cortisone      Redness at site of injection   • Doxycycline Rash   • Keflex   "  • Pcn [Penicillins] Vomiting       PHYSICAL EXAM  VITAL SIGNS: Pulse 75  Temp(Src) 36.7 °C (98 °F)  Resp 20  Ht 1.702 m (5' 7\")  Wt 65.772 kg (145 lb)  BMI 22.71 kg/m2  SpO2 100%   Constitutional: Active bag valve mask ventilation  HENT: Mucous membranes laceration left upper lip. Poor dentition. Excoriation and mucous membrane irritation of the posterior pharynx.  Eyes: Pupils 3 mm equal and reactive  Neck: Supple  Cardiovascular: Normal heart rate, Normal rhythm.  Symmetric peripheral pulses.   Thorax & Lungs: No respiratory distress, No wheezing, No rales, No rhonchi, No chest tenderness.   Abdomen: Bowel sounds normal, soft, non-distended, nontender, no mass  Skin: Warm, Dry, No rash.   Back: No tenderness, No CVA tenderness.   Extremities: Well-perfused  Neurologic:  Posturing bilateral upper extremities toes downgoing.      RADIOLOGY/PROCEDURES  CT-HEAD W/O   Final Result      1.  Hemorrhage centered in the left thalamus with intraventricular extension.      2.  Ventricular dilatation with transependymal flow of CSF.      3.  Basilar cistern effacement and crowding the foramen magnum         Comment: Results discussed with Dr. Roblero at 2:15 PM         CT-CTA HEAD WITH & W/O-POST PROCESS    (Results Pending)   DX-CHEST-PORTABLE (1 VIEW)    (Results Pending)   CT-CSPINE WITHOUT PLUS RECONS    (Results Pending)      Imaging is interpreted by radiologist    Intubation Procedure Note    Indication: Respiratory failure    Consent: Unable to be obtained due to the emergent nature of this procedure.    Medications Used: etomidate 20 mg intravenously and succinycholine 100 mg intravenously    Procedure: The patient was placed in the appropriate position.  Cricoid pressure was utilized.  Intubation was performed by direct laryngoscopy using a laryngoscope and a 7.0 cuffed endotracheal tube.  The cuff was then inflated and the tube was secured appropriately at a distance of 21 cm to the dental ridge.  Initial " confirmation of placement included bilateral breath sounds, an end tidal CO2 detector, absence of sounds over the stomach, tube fogging, adequate chest rise and adequate pulse oximetry reading.  A chest x-ray to verify correct placement of the tube showed appropriate tube position.    The patient tolerated the procedure well.     Complications: None      Labs:  Results for orders placed or performed during the hospital encounter of 04/19/17   CBC WITH DIFFERENTIAL   Result Value Ref Range    WBC 15.3 (H) 4.8 - 10.8 K/uL    RBC 4.53 4.20 - 5.40 M/uL    Hemoglobin 13.3 12.0 - 16.0 g/dL    Hematocrit 40.7 37.0 - 47.0 %    MCV 89.8 81.4 - 97.8 fL    MCH 29.4 27.0 - 33.0 pg    MCHC 32.7 (L) 33.6 - 35.0 g/dL    RDW 43.8 35.9 - 50.0 fL    Platelet Count 279 164 - 446 K/uL    MPV 10.3 9.0 - 12.9 fL    Neutrophils-Polys 62.90 44.00 - 72.00 %    Lymphocytes 29.30 22.00 - 41.00 %    Monocytes 5.40 0.00 - 13.40 %    Eosinophils 1.10 0.00 - 6.90 %    Basophils 0.60 0.00 - 1.80 %    Immature Granulocytes 0.70 0.00 - 0.90 %    Nucleated RBC 0.00 /100 WBC    Neutrophils (Absolute) 9.63 (H) 2.00 - 7.15 K/uL    Lymphs (Absolute) 4.48 1.00 - 4.80 K/uL    Monos (Absolute) 0.83 0.00 - 0.85 K/uL    Eos (Absolute) 0.17 0.00 - 0.51 K/uL    Baso (Absolute) 0.09 0.00 - 0.12 K/uL    Immature Granulocytes (abs) 0.11 0.00 - 0.11 K/uL    NRBC (Absolute) 0.00 K/uL   COMP METABOLIC PANEL   Result Value Ref Range    Sodium 137 135 - 145 mmol/L    Potassium 3.2 (L) 3.6 - 5.5 mmol/L    Chloride 111 96 - 112 mmol/L    Co2 18 (L) 20 - 33 mmol/L    Anion Gap 8.0 0.0 - 11.9    Glucose 235 (H) 65 - 99 mg/dL    Bun 9 8 - 22 mg/dL    Creatinine 0.70 0.50 - 1.40 mg/dL    Calcium 8.5 8.5 - 10.5 mg/dL    AST(SGOT) 16 12 - 45 U/L    ALT(SGPT) 10 2 - 50 U/L    Alkaline Phosphatase 107 (H) 30 - 99 U/L    Total Bilirubin 0.2 0.1 - 1.5 mg/dL    Albumin 3.7 3.2 - 4.9 g/dL    Total Protein 6.4 6.0 - 8.2 g/dL    Globulin 2.7 1.9 - 3.5 g/dL    A-G Ratio 1.4 g/dL    PROTHROMBIN TIME   Result Value Ref Range    PT 12.6 12.0 - 14.6 sec    INR 0.92 0.87 - 1.13   APTT   Result Value Ref Range    APTT 28.0 24.7 - 36.0 sec   TROPONIN   Result Value Ref Range    Troponin I <0.01 0.00 - 0.04 ng/mL   TRIGLYCERIDE   Result Value Ref Range    Triglycerides 88 0 - 149 mg/dL   ESTIMATED GFR   Result Value Ref Range    GFR If African American >60 >60 mL/min/1.73 m 2    GFR If Non African American >60 >60 mL/min/1.73 m 2   EKG (NOW)   Result Value Ref Range    Report       [facility] Emergency Dept.    Test Date:  2017  Pt Name:    CHARLETTE RIGGS               Department: ER  MRN:        [id number]                      Room:        11  Gender:     F                            Technician: [id number]  :        1970                   Requested By:ROHAN SESAY  Order #:    [id number]                    Reading MD:    Measurements  Intervals                                Axis  Rate:       89                           P:          80  KY:         152                          QRS:        75  QRSD:       74                           T:          53  QT:         416  QTc:        507    Interpretive Statements  SINUS RHYTHM  BORDERLINE PROLONGED QT INTERVAL  Compared to ECG 2013 10:35:49  No significant changes         COURSE & MEDICAL DECISION MAKING  Patient presents with altered mental status. She is alarming exam with decorticate posturing. She is emergently intubated as above. She is taken directly to CT scan that reveals intracranial hemorrhage. Paged neurosurgery at time of CT scan. Antihypertensive intracranial hemorrhage protocol was initiated. Patient is sedated with propofol following intubation. Discussed case with Dr. Contreras. He will see the patient. He reviewed the scan remotely and stated there is a poor prognosis. I spoke with Dr. Xiao who will be admitting the patient. I consulted Dr. Neely with pulmonary critical care who will see the  patient.    FINAL IMPRESSION  1. Intracranial hemorrhage  2. Respiratory failure  3. Endotracheal intubation by me    CRITICAL CARE TIME 40 minutes  There was a very real possibility of deterioration of the patient's condition.  This patient required the highest level of care.  I provided critical care services which included: review of the medical record, treatment orders, ordering and reviewing test results, frequent reevaluation of the patient's condition and response to treatment, as well as discussing the case with appropriate personnel and various consultants. The critical care time associated with the care of this patient is exclusive of any procedures or specific interventions.        This dictation was created using voice recognition software. The accuracy of the dictation is limited to the abilities of the software.  The nursing notes were reviewed and certain aspects of this information were incorporated into this note.      Electronically signed by: Gabe Roblero, 4/19/2017 2:29 PM

## 2017-04-19 NOTE — ED NOTES
Pt bib [facility] for ALOC. Pt walked into her family's house and collasped. Pt was acting normal prior to collapsing. Pt arrived to ER being venilated via bag valve mask. Pt posturing upon arrival, no response to painful stimuli. ERP at bedside.

## 2017-04-19 NOTE — ED NOTES
Med rec complete  Allergies reviewed historically    Per family at bedside she goes to Dr Urias [phone] and Imitrex was the only medication she gets, also called a few pharmacies in her area and they haven't filled for her. Med's deleted off med rec were from 2014

## 2017-04-19 NOTE — CONSULTS
CHIEF COMPLAINT  Chief Complaint unconscious   Patient presents with            HPI  46 y.o. female who presents with altered mental status. Patient was at her cousin's house and suddenly collapsed. She was unresponsive. EMS was notified. They found the patient with posturing. She was given 2 mg of Narcan without change. Blood sugar was acceptable. [place] [facility] [place] back valve mask ventilation.  She was intubated in the ED for airway protection given her neurologic examination.   I am unable to obtain any history from the patient as she is unconsious and intubated.    REVIEW OF SYSTEMS  Unable to obtain    PAST MEDICAL HISTORY   Past Medical History     Past Medical History    Diagnosis  Date    •  Psychiatric disorder      •  ASTHMA      •  Depression      •  Dental disorder      •  Unspecified disorder of thyroid             SOCIAL HISTORY  Social History    Substance Use Topics    •  Smoking status:  Current Every Day Smoker -- 0.50 packs/day for 30 years        Types:  Cigarettes    •  Smokeless tobacco:  Not on file          Comment: 0.5ppd    •  Alcohol Use:  No        SURGICAL HISTORY   Past Surgical History     Past Surgical History    Procedure  Laterality  Date    •  Tonsillectomy        •  Other abdominal surgery                •  Exploratory laparotomy            for ovarian cysts     •  Melody by laparoscopy    3/12/2011        Performed by JOSE PICKETT at [facility]    •  Hysterectomy laparoscopy        •  Hernia repair        •  Laparoscopy    2013        Performed by Jose Pickett M.D. at [facility]    •  Laparoscopy    2014        Performed by Jose Pickett M.D. at [facility]    •  Exploratory laparotomy    2014        Performed by oJse Pickett M.D. at [facility]    •  Appendectomy    2014        Performed by Jose Pickett M.D. at [facility]      "      CURRENT MEDICATIONS  Home Medications       **Home medications have not yet been reviewed for this encounter**            ALLERGIES  Allergies    Allergen  Reactions    •  Amoxicillin      •  Cortisone          Redness at site of injection    •  Doxycycline  Rash    •  Keflex      •  Pcn [Penicillins]  Vomiting        PHYSICAL EXAM  VITAL SIGNS: Pulse 75  Temp(Src) 36.7 °C (98 °F)  Resp 20  Ht 1.702 m (5' 7\")  Wt 65.772 kg (145 lb)  BMI 22.71 kg/m2  SpO2 100%    Constitutional: Intubated  HENT: Mucous membranes laceration left upper lip. Poor dentition. Excoriation and mucous membrane irritation of the posterior pharynx.  Eyes: Pupils 4 mm equal and non reactive  Neck: Supple  Neurologic: no eye opening, not following commands  No corneal reflex  +Doll's eyes with head movement  No motor response to deep stimulation      RADIOLOGY/PROCEDURES  CT-HEAD W/O    Final Result        1.  Hemorrhage centered in the left thalamus with intraventricular extension.        2.  Ventricular dilatation with transependymal flow of CSF.        3.  Basilar cistern effacement and crowding the foramen magnum            Comment: Results discussed with Dr. Roblero at 2:15 PM            CT-CTA HEAD WITH & W/O-POST PROCESS    (Results Pending)    DX-CHEST-PORTABLE (1 VIEW)    (Results Pending)    CT-CSPINE WITHOUT PLUS RECONS    (Results Pending)       Imaging is interpreted by radiologist    Labs:  Results for orders placed or performed during the hospital encounter of 04/19/17    CBC WITH DIFFERENTIAL    Result  Value  Ref Range      WBC  15.3 (H)  4.8 - 10.8 K/uL      RBC  4.53  4.20 - 5.40 M/uL      Hemoglobin  13.3  12.0 - 16.0 g/dL      Hematocrit  40.7  37.0 - 47.0 %      MCV  89.8  81.4 - 97.8 fL      MCH  29.4  27.0 - 33.0 pg      MCHC  32.7 (L)  33.6 - 35.0 g/dL      RDW  43.8  35.9 - 50.0 fL      Platelet Count  279  164 - 446 K/uL      MPV  10.3  9.0 - 12.9 fL      Neutrophils-Polys  62.90  44.00 - 72.00 %      " Lymphocytes  29.30  22.00 - 41.00 %      Monocytes  5.40  0.00 - 13.40 %      Eosinophils  1.10  0.00 - 6.90 %      Basophils  0.60  0.00 - 1.80 %      Immature Granulocytes  0.70  0.00 - 0.90 %      Nucleated RBC  0.00  /100 WBC      Neutrophils (Absolute)  9.63 (H)  2.00 - 7.15 K/uL      Lymphs (Absolute)  4.48  1.00 - 4.80 K/uL      Monos (Absolute)  0.83  0.00 - 0.85 K/uL      Eos (Absolute)  0.17  0.00 - 0.51 K/uL      Baso (Absolute)  0.09  0.00 - 0.12 K/uL      Immature Granulocytes (abs)  0.11  0.00 - 0.11 K/uL      NRBC (Absolute)  0.00  K/uL    COMP METABOLIC PANEL    Result  Value  Ref Range      Sodium  137  135 - 145 mmol/L      Potassium  3.2 (L)  3.6 - 5.5 mmol/L      Chloride  111  96 - 112 mmol/L      Co2  18 (L)  20 - 33 mmol/L      Anion Gap  8.0  0.0 - 11.9      Glucose  235 (H)  65 - 99 mg/dL      Bun  9  8 - 22 mg/dL      Creatinine  0.70  0.50 - 1.40 mg/dL      Calcium  8.5  8.5 - 10.5 mg/dL      AST(SGOT)  16  12 - 45 U/L      ALT(SGPT)  10  2 - 50 U/L      Alkaline Phosphatase  107 (H)  30 - 99 U/L      Total Bilirubin  0.2  0.1 - 1.5 mg/dL      Albumin  3.7  3.2 - 4.9 g/dL      Total Protein  6.4  6.0 - 8.2 g/dL      Globulin  2.7  1.9 - 3.5 g/dL      A-G Ratio  1.4  g/dL    PROTHROMBIN TIME    Result  Value  Ref Range      PT  12.6  12.0 - 14.6 sec      INR  0.92  0.87 - 1.13    APTT    Result  Value  Ref Range      APTT  28.0  24.7 - 36.0 sec    TROPONIN    Result  Value  Ref Range      Troponin I  <0.01  0.00 - 0.04 ng/mL    TRIGLYCERIDE    Result  Value  Ref Range      Triglycerides  88  0 - 149 mg/dL    ESTIMATED GFR    Result  Value  Ref Range      GFR If African American  >60  >60 mL/min/1.73 m 2      GFR If Non African American  >60  >60 mL/min/1.73 m 2    EKG (NOW)    Result  Value  Ref Range      Report            [facility] Emergency Dept.    Test Date:  2017-04-19  Pt Name:    CHARLETTE KEELY               Department: ER  MRN:         [id number]                      Room:        11  Gender:     F                            Technician: [id number]  :        1970                   Requested By:ROHAN SESAY  Order #:    [id number]                    Reading MD:    Measurements  Intervals                                Axis  Rate:       89                           P:          80  SC:         152                          QRS:        75  QRSD:       74                           T:          53  QT:         416  QTc:        507    Interpretive Statements  SINUS RHYTHM  BORDERLINE PROLONGED QT INTERVAL  Compared to ECG 2013 10:35:49  No significant changes              A/P:  46 year old female with acute intraventricular hemorrhage.  She was intubated in the ED for airway protection given poor neurologic examination.  I recommend right ventriculostomy placement for CSF diversion given CSF outflow obstruction by the intraventricular hemorrhage.  The overall outcome following this injury is extremely poor.  This was discussed with her cousin, who is the only family available.  She expressed understanding.  The procedure was discussed in detail; risks including but not limited to, infection, bleeding, injury to brain, catheter not working due to blockage, and poor outcome despite CSF diversion were discussed.  Informed consent was obtained on an emergent basis.

## 2017-04-19 NOTE — ED NOTES
Pt started on Nicardipine drip for BP control. ERP at bedside discussing pt with pt's family. Will continued to monitor pt.

## 2017-04-19 NOTE — CONSULTS
Donor [facility]    Onsite Evalutation    Referral # [id number]    Date 04/19/2017 Time: 1504    Thank you for the referral of this patient. A chart review has been completed to determine suitability for organ donation.     Donation is an option.  - Upon meeting criteria for brain death, this patient will be a potential candidate for organ donation, pending further evaluation.      - This patient may meet the criteria for DCD (donation after circulatory death). Further evaluation will be needed should the family decide to withdraw support.      -This patient has been located on an organ donor registry and is a first person authorized organ donor, in the event of their death.  A copy of their document of gift has been placed in the hospital chart; it is a legal binding document.      [facility] will continue to follow this case.    Please call us immediately with any problems, questions, or significant changes in the patient's status.     Please call us immediately with any plans for brain death evaluation, family meetings or plans to withdraw care.     Organ donation should not be mentioned without prior collaboration with [facility] so that the conversation can be a planned, one-time coordinated event with the health care team.     Call [phone] with any immediate needs.     Thank you for your continued support of organ and tissue donation.       Skyla Arguello, RN, BSN  Clinical  III

## 2017-04-20 PROBLEM — R73.9 HYPERGLYCEMIA: Status: ACTIVE | Noted: 2017-01-01

## 2017-04-20 PROBLEM — J96.01 ACUTE RESPIRATORY FAILURE WITH HYPOXIA (HCC): Status: ACTIVE | Noted: 2017-01-01

## 2017-04-20 PROBLEM — F15.20 METHAMPHETAMINE DEPENDENCE (HCC): Status: ACTIVE | Noted: 2017-01-01

## 2017-04-20 PROBLEM — E87.6 HYPOKALEMIA: Status: ACTIVE | Noted: 2017-01-01

## 2017-04-20 PROBLEM — I61.9 CEREBRAL HEMORRHAGE (HCC): Status: RESOLVED | Noted: 2017-01-01 | Resolved: 2017-01-01

## 2017-04-20 NOTE — CONSULTS
DATE OF SERVICE:  04/19/2017    PULMONARY AND CRITICAL CARE CONSULTATION    REFERRING PHYSICIAN:  Dr. Nate Xiao.    REASON FOR CONSULTATION:  Ventilator management after acute intraventricular   hemorrhage.    HISTORY OF PRESENT ILLNESS:  The patient is a 46-year-old female with a past   medical history of tobacco abuse as well as depression who was brought in by   EMS this evening after she was at her cousin's house and suddenly collapsed.    The cousin reported that she was unresponsive and 911 was called.  When EMS   arrived, they found that the patient was posturing.  The patient was given 2   mg of Narcan without change her blood sugars with acceptable at that point and   was transported while she was actively being mask valve ventilation.  While   in the emergency department due to her poor mental status and lack of   protecting her airway, she was intubated by the emergency room staff.    Laboratories returned not showing anything of significance.  The CT of the   brain was obtained showing hemorrhage presented in the left thalamus with   intraventricular extension.  Neurosurgery was notified and took her to the   operating room in which they placed a ventricular drain.  She is currently   mildly sedated on the ventilator at the time of the interview.    REVIEW OF SYSTEMS:  Unobtainable as the patient is slightly sedated, but   mainly obtunded on the ventilator.    PAST MEDICAL HISTORY:  Obtained via old chart, it appears to be depression,   history of psychiatric disorder for which she takes Abilify, tobacco use, as   well as possible migraine headaches.    PAST SURGICAL HISTORY:  It appears that patient has had tonsillectomy in 1974,   exploratory laparotomy for ovarian cyst in the past, cholecystectomy in 2011,   hysterectomy as well as an appendectomy and hernia repair.    SOCIAL HISTORY:  The patient is a current smoker and smokes approximately half   a pack a day for the past 30 years.  There is no  mention of alcohol use in   the past; however, she has a remote history of using methamphetamine and last   time was approximately 2-1/2 years ago.  There is no mention of her employment   status or where she is from.  There are no family members available to ask   any additional questions.    FAMILY HISTORY:  Apparently, cancer, diabetes, and hypertension run in the   family according to chart note.    OUTPATIENT MEDICATIONS:  Include Imitrex.    ALLERGIES:  INCLUDE AMOXICILLIN, CORTISONE, DOXYCYCLINE, KEFLEX, AND   PENICILLINS.    PHYSICAL EXAMINATION:  VITAL SIGNS:  Blood pressure range from 140s-180s over 70s-90s, heart rate in   the 70s-80s, T-max of 98.0, respiratory rate of 20, oxygenation 99%-100% on   40% FiO2.  GENERAL:  The patient is mildly sedated, however, is essentially unresponsive   to any noxious stimuli at the time of the evaluation.  HEENT:  Pupils are 4 mm and equal but nonreactive to light.  EOMI was not   assessed.  Conjunctivae are pink.  Sclerae are anicteric.  Oropharynx has ET   tube in place with moist mucous membranes and very severe, poor dentition.  NECK:  Supple, No adenopathy or thyromegaly appreciated.  CARDIOVASCULAR:  Regular rate and rhythm without murmur, rub or gallop.  LUNGS:  Clear to auscultation bilaterally.  ABDOMEN:  Hypoactive bowel sounds, soft, nontender, and nondistended.  No   masses appreciated.  EXTREMITIES:  2+ dorsal pedal pulses equally, 2+ radial pulses equally, no   clubbing, cyanosis or edema.  Brisk capillary refill.  NEUROLOGIC:  Patient is mildly sedated; however, unresponsive to any noxious   stimuli.  Pupils were noted above.  She does have no corneal reflex.  She has   no eye opening.  She also has Doll's eyes with head movement.    LABORATORY DATA:  White count 15.3, hemoglobin of 13.3, hematocrit of 40.7,   platelets of 279.  Sodium of 137, potassium of 3.2, chloride of 111, serum   bicarbonate of 18, glucose of 235, BUN of 9, creatinine of 0.7,  calcium is   8.5, AST is 16, ALT is 10, alkaline phosphatase of 107, total bilirubin of   0.2, albumin of 3.7.  Troponin is less than 0.01.  PT is 12.6.  INR is 0.92,   PTT is 28.0.  Urine drug screen is positive for methamphetamines.  Most recent   ABG shows that pH 7.308, pCO2 of 36, pO2 of 337 on vent settings of mode of   CMV, rate of 26, tidal volume of 400, PEEP of 8 and 30% FiO2.  Chest x-ray   shows bilateral pleural effusions with ET tube approximately 7-8 cm above the   [place].  CT of the brain reveals intraparenchymal hemorrhage presented on the   left thalamus measuring approximately 2.8 x 2.3 x 2.5 cm.  Hemorrhage extends   into the lateral, third, and fourth ventricles.  There is also hydrocephalus   with transependymal flow of CSF.  CTA of the neck and brain shows no aneurysms   or dissections.  CT of spine reveals no acute cervical spine fracture.    IMPRESSION:  1.  Ventilator-dependent respiratory failure secondary to large   intraventricular hemorrhage.  2.  Acute large intraventricular hemorrhage likely related to methamphetamine   abuse.  3.  Acute hydrocephalus related to intraventricular hemorrhage status post   ventriculostomy placement.  4.  Acute leukocytosis.  5.  Hypokalemia.  6.  History of depression and psychiatric disorder, using Abilify.  7.  Methamphetamine positive on urine drug screen.  8.  Tobacco abuse.  9.  Incomplete database.    PLAN:  This patient is critically ill at this time with very poor prognosis   due to the present of the intraventricular hemorrhage.  She has been intubated   and continued on full ventilatory support at this time and will continue to   do so with continued respiratory therapy protocols.  The patient has been   evaluated by neurosurgery who placed a ventriculostomy draining due to   hydrocephalus and determine if there is any mental status changes over the   next few days.  The patient's family has been well informed the patient's poor   prognosis at  this point.  In the meantime, we will continue to support the   patient's blood pressure and monitor for diabetes insipidus as well as   herniation.  In the meantime, the patient will be covered for aspiration   pneumonia due to her elevated white count and will replete all of her   electrolytes such as potassium.  Patient has no history of diabetes on her   records; however, her elevated glucose level is suspicious and we will   continue to trend these and start insulin sliding scale if needed.  We will   start also prophylaxis, but avoid any DVT prophylaxis at this time.    Thank you for allowing the pulmonary critical care team to participate in this   patient's care.  We will continue to follow along.    This patient's case was discussed at length with the ER physician, Dr. Roblero, as well as neurosurgeon, Dr. Contreras, the ICU nursing staff, and   respiratory therapist.  This patient is critically ill at this time.    Critical care time is approximately 60 minutes in direct critical care as well   as extensive data review.  This excludes any procedures and does not overlap   with other physicians.       ____________________________________     MD SANJIV CRUZ / MAMIE    DD:  04/19/2017 18:13:32  DT:  04/19/2017 22:12:28    D#:  [id number]  Job#:  [id number]

## 2017-04-20 NOTE — PROGRESS NOTES
Patient to ICU from OR with anesthesia and RN. Ventric drain and ICP monitor set up per MD orders. Opening ICP was 9. Will continue with open drain and q30 min ICP checks, and q1 hour neuro assessments. Family to bedside. Lila Gilliland updated via phone by nsx.

## 2017-04-20 NOTE — DISCHARGE PLANNING
Staffed case in AM rounds.  Pt was admitted 2017 after collapsing at a family member's house (prior notes indicate pt was accompanied by her cousin Lila in the ER).  Pt's UDS positive for methamphetamine.  Diagnosis intraventricular and subarachnoid hemorrhage, hypokalemia, chronic pain and depression, acute hypoxic respiratory failure.  Need to locate [facility] in order to discuss POC.      KAYLEIGH attempted phone contact to pt's emergency contacts listed on facesheet (relative Lila Yadav, [phone] and SO Duke Zelaya [phone]).  Left VM requesting call back.    Received call back from pt's cousin Lila Yadav.  Lila confirmed she was with pt yesterday in ED.  Lila reports pt's  passed away 3 years ago, both of her parents are , pt has an adult son but Lila does not know his personal contact information.  Lila confirmed pt has a SO Duke Zelaya that she lives with, Lila states Duke should be able to provide information re: pt's family (pt's son and she also has a sister in [place]), his contact number is [phone] and Lila states both she and Duke will be coming to hospital later this date (after 12:30pm).  KAYLEIGH will attempt to meet with pt's SO later this PM.

## 2017-04-20 NOTE — CARE PLAN
Problem: Ventilation Defect:  Goal: Ability to achieve and maintain unassisted ventilation or tolerate decreased levels of ventilator support  Intervention: Support and monitor invasive and noninvasive mechanical ventilation  Adult Ventilation Update    Total Vent Days: 2      Patient Lines/Drains/Airways Status    Active Airway      Name: Placement date: Placement time: Site: Days:     Airway Group ET Tube Oral 7.5 04/19/17  1345  Oral  2                  Plateau Pressure (Q Shift): 17 (04/20/17 0352)  Static Compliance (ml / cm H2O): 47 (04/20/17 1433)    Mobility Group  Activity Performed: Unable to mobilize (04/20/17 0800)    Events/Summary/Plan: no vent changes made (04/20/17 1433)

## 2017-04-20 NOTE — PROGRESS NOTES
Hospital Medicine Progress Note, Adult, Complex               Author: Reggie Isaacs Marco A Date & Time created: 4/20/2017  7:50 AM     Interval History:  Pt admitted with ICH, positive urine screen for meth  Bradycardia yesterday postop   Review of Systems:  Review of Systems   Unable to perform ROS: medical condition       Physical Exam:  Physical Exam   Constitutional: She appears well-developed.   HENT:   Head: Normocephalic and atraumatic.   Right Ear: External ear normal.   Left Ear: External ear normal.   Mouth/Throat: No oropharyngeal exudate.   Eyes: Conjunctivae are normal. Right eye exhibits no discharge. Left eye exhibits no discharge. No scleral icterus.   Pupils non reactive   Neck: Neck supple. No JVD present. No tracheal deviation present.   Cardiovascular: Normal rate and regular rhythm.  Exam reveals no gallop and no friction rub.    No murmur heard.  Pulmonary/Chest: Effort normal and breath sounds normal. No stridor. No respiratory distress. She has no wheezes. She has no rales. She exhibits no tenderness.   Abdominal: Soft. Bowel sounds are normal. She exhibits no distension. There is no tenderness. There is no rebound.   Musculoskeletal: She exhibits no edema or tenderness.   Neurological:   Pupils fixed, decorticate posturing   Skin: Skin is warm and dry. She is not diaphoretic. No cyanosis. Nails show no clubbing.   Psychiatric: Cognition and memory are impaired. She is noncommunicative.   Nursing note and vitals reviewed.      Labs:  Recent Labs      04/19/17   1441  04/19/17   1727  04/20/17   0510   ISTATAPH  7.249*  7.308*  7.380*   ISTATAPCO2  51.3*  36.1  31.7   ISTATAPO2  399*  337*  97*   ISTATATCO2  24  19*  20   [id number]  100*  100*  97   ISTATARTHCO3  22.5  18.1  18.7   ISTATARTBE  -5*  -8*  -6*   ISTATTEMP  96.2 F  92.0 F  97.2 F   ISTATFIO2  100  60  30   ISTATSPEC  Arterial  Arterial  Arterial   ISTATAPHTC  7.268*  7.359*  7.391*   [id number]  392*  318*  92*     Recent Labs       17   1336   TROPONINI  <0.01     Recent Labs      17   1336  17   0430   SODIUM  137  142   POTASSIUM  3.2*  3.8   CHLORIDE  111  115*   CO2  18*  17*   BUN  9  6*   CREATININE  0.70  0.56   MAGNESIUM   --   1.6   PHOSPHORUS   --   2.6   CALCIUM  8.5  8.0*     Recent Labs      17   1336  17   0430   ALTSGPT  10  13   ASTSGOT  16  34   ALKPHOSPHAT  107*  83   TBILIRUBIN  0.2  0.3   GLUCOSE  235*  125*     Recent Labs      17   1336  17   0430   RBC  4.53  4.27   HEMOGLOBIN  13.3  12.6   HEMATOCRIT  40.7  38.4   PLATELETCT  279  182   PROTHROMBTM  12.6   --    APTT  28.0   --    INR  0.92   --      Recent Labs      17   1336  17   0430   WBC  15.3*  11.3*   NEUTSPOLYS  62.90  82.60*   LYMPHOCYTES  29.30  10.20*   MONOCYTES  5.40  6.30   EOSINOPHILS  1.10  0.20   BASOPHILS  0.60  0.30   ASTSGOT  16  34   ALTSGPT  10  13   ALKPHOSPHAT  107*  83   TBILIRUBIN  0.2  0.3           Hemodynamics:  Temp (24hrs), Av.7 °C (98 °F), Min:36.7 °C (98 °F), Max:36.7 °C (98 °F)  Temperature: 36.7 °C (98 °F), Monitored Temp: 36.1 °C (97 °F)  Pulse  Av.9  Min: 44  Max: 98Heart Rate (Monitored): 97  Arterial BP: 138/80 mmHg, NIBP: 119/67 mmHg     Respiratory:  Saleem Vent Mode: APVCMV, Rate (breaths/min): 18, PEEP/CPAP: 8, FiO2: 30, P Peak (PIP): 18, P MEAN: 11 Respiration: 20, Pulse Oximetry: 100 %     Work Of Breathing / Effort: Vented  RUL Breath Sounds: Clear, RML Breath Sounds: Clear, RLL Breath Sounds: Diminished, AKIL Breath Sounds: Clear, LLL Breath Sounds: Diminished  Fluids:    Intake/Output Summary (Last 24 hours) at 17 0750  Last data filed at 17 0600   Gross per 24 hour   Intake 3345.26 ml   Output   3036 ml   Net 309.26 ml     Weight: 65.772 kg (145 lb)  GI/Nutrition:  Orders Placed This Encounter   Procedures   • Diet NPO     Standing Status: Standing      Number of Occurrences: 1      Standing Expiration Date:      Order Specific Question:   Type:     Answer:  Now [1]     Order Specific Question:  Restrict to:     Answer:  Strict [1]     Medical Decision Making, by Problem:  Active Hospital Problems    Diagnosis   • *Intraventricular hemorrhage (CMS-HCC) [I61.5]  -s/p EVD  -overall prognosis for meaningful recovery is extremely guarded  -NSGY following  -will ask MSW to locate [facility] to discuss treatment options   • Methamphetamine dependence (CMS-HCC) [F15.20]   • Acute respiratory failure with hypoxia (CMS-[facility]) [J96.01]  -vent management per dr saalzar   • Hyperglycemia [R73.9]  -d/c D5 monitor cbg's   • Hypokalemia [E87.6]  -replete and monitor           Labs reviewed, Medications reviewed and Radiology images reviewed  Persaud catheter: Unconscious / Sedated Patient on a Ventilator      DVT Prophylaxis: Contraindicated - High bleeding risk  DVT prophylaxis - mechanical: SCDs  Ulcer prophylaxis: Yes

## 2017-04-20 NOTE — PROGRESS NOTES
Pulmonary Critical Care Progress Note        Date of Service:  4/20/17  Chief Complaint: Collapsed and unresponsive    History of Present Illness: The patient is a 46-year-old female with a past medical history of tobacco abuse as well as depression who was brought in by EMS this evening after she was at her cousin's house and suddenly collapsed. The cousin reported that she was unresponsive and 911 was called.  When EMS arrived, they found that the patient was posturing.  The patient was given 2 mg of Narcan without change her blood sugars with acceptable at that point and was transported while she was actively being mask valve ventilation.  While in the emergency department due to her poor mental status and lack of protecting her airway, she was intubated by the emergency room staff. Laboratories returned not showing anything of significance.  The CT of the brain was obtained showing hemorrhage presented in the left thalamus with intraventricular extension.  Neurosurgery was notified and took her to the operating room in which they placed a ventricular drain.  She is currently mildly sedated on the ventilator at the time of the interview.    ROS:  Respiratory: unable to perform due to the patient's inability to effectively communicate Cardiac: unable to perform due to the patient's inability to effectively communicate   GI: unable to perform due to the patient's inability to effectively communicate.    All other systems negative.    Interval Events:  24 hour interval history reviewed   No overnight changes  Q1 neuro and ventric check  Posturing  Turned off propofol and BP increased, turned back on  SR, SB after surgery but resolved, BP okay  green secretion from OG  Afebrile  Prop 15  ERP protocol  Nicardipine and norepinephrine to PRN. Remove OG and place Cortrak for tube feeding.      PFSH:  No change.    Respiratory:  Saleem Vent Mode: APVCMV, Rate (breaths/min): 18, Vt Target (mL): 400, PEEP/CPAP: 8, FiO2:  30, Static Compliance (ml / cm H2O): 47, Control VTE (exp VT): 397  Pulse Oximetry: 100 %PIP: 17    Exam: Clear to auscultation bilaterally.  ImagingAvailable data reviewed   Recent Labs      04/19/17   1441  04/19/17   1727  04/20/17   0510   ISTATAPH  7.249*  7.308*  7.380*   ISTATAPCO2  51.3*  36.1  31.7   ISTATAPO2  399*  337*  97*   ISTATATCO2  24  19*  20   [id number]  100*  100*  97   ISTATARTHCO3  22.5  18.1  18.7   ISTATARTBE  -5*  -8*  -6*   ISTATTEMP  96.2 F  92.0 F  97.2 F   ISTATFIO2  100  60  30   ISTATSPEC  Arterial  Arterial  Arterial   ISTATAPHTC  7.268*  7.359*  7.391*   [id number]  392*  318*  92*       HemoDynamics:  Pulse: 98, Heart Rate (Monitored): 98  Arterial BP: 133/77 mmHg, NIBP: 118/75 mmHg       Exam: Regular rate, regular rhythm  Imaging: Available data reviewed  Recent Labs      04/19/17   1336   TROPONINI  <0.01       Neuro:  GCS Total Paras Coma Score: 3  ICP: 18 MM HG    Exam: Pupils 2-3mm and not reactive, no corneal reflex. No response to painful stimuli. Bilateral up-going toes.  Imaging: Available data reviewed   Propofol 40    Fluids:  Intake/Output       04/18/17 0700 - 04/19/17 0659 (Not Admitted) 04/19/17 0700 - 04/20/17 0659 04/20/17 0700 - 04/21/17 0659      0528-4690 4541-0621 Total 1828-1845 0580-8570 Total 3399-7137 3591-5385 Total       Intake    I.V.  --  -- --  1632.9  1370.7 3003.7  --  -- --    Crystalloid Intake -- -- -- 600 -- 600 -- -- --    Propofol Volume -- -- -- 32.9 120.7 153.7 -- -- --    IV Volume (Bolus) -- -- -- 1000 -- 1000 -- -- --    D5NS 20K -- -- -- -- 1250 1250 -- -- --    Total Intake -- -- -- 1632.9 1370.7 3003.7 -- -- --       Output    Urine  --  -- --  1950  800 2750  --  -- --    Indwelling Cathether -- -- --  -- -- --    Void (ml) -- -- -- 1300 -- 1300 -- -- --    Drains  --  -- --  --  99 99  --  -- --    Right Ventriculostomy -- -- -- -- 99 99 -- -- --    Blood  --  -- --  30  -- 30  --  -- --    Est. Blood Loss (mL) -- --  -- 30 -- 30 -- -- --    Total Output -- -- -- 8467 909 0108 -- -- --       Net I/O     -- -- -- -347.1 471.7 124.7 -- -- --        Weight: 65.772 kg (145 lb)  Recent Labs      17   0430   SODIUM  137  142   POTASSIUM  3.2*  3.8   CHLORIDE  111  115*   CO2  18*  17*   BUN  9  6*   CREATININE  0.70  0.56   MAGNESIUM   --   1.6   PHOSPHORUS   --   2.6   CALCIUM  8.5  8.0*       GI/Nutrition:  Exam: Bowel sounds present.  Imaging: Available data reviewed  tube feed Tolerated  Liver Function  Recent Labs      17   0430   ALTSGPT  10  13   ASTSGOT  16  34   ALKPHOSPHAT  107*  83   TBILIRUBIN  0.2  0.3   GLUCOSE  235*  125*       Heme:  Recent Labs      17   0430   RBC  4.53  4.27   HEMOGLOBIN  13.3  12.6   HEMATOCRIT  40.7  38.4   PLATELETCT  279  182   PROTHROMBTM  12.6   --    APTT  28.0   --    INR  0.92   --        Infectious Disease:  Temp  Av.7 °C (98 °F)  Min: 36.7 °C (98 °F)  Max: 36.7 °C (98 °F)  Monitored Temp  Av.1 °C (97 °F)  Min: 33.5 °C (92.3 °F)  Max: 37.8 °C (100 °F)  Micro: reviewed  Recent Labs      17   0430   WBC  15.3*  11.3*   NEUTSPOLYS  62.90  82.60*   LYMPHOCYTES  29.30  10.20*   MONOCYTES  5.40  6.30   EOSINOPHILS  1.10  0.20   BASOPHILS  0.60  0.30   ASTSGOT  16  34   ALTSGPT  10  13   ALKPHOSPHAT  107*  83   TBILIRUBIN  0.2  0.3     Current Facility-Administered Medications   Medication Dose Frequency Provider Last Rate Last Dose   • midazolam (VERSED) injection 1-5 mg  1-5 mg Q HOUR PRN Gabe Roblero M.D.       • propofol (DIPRIVAN) injection  5-80 mcg/kg/min Continuous Gabe Roblero M.D. 15.8 mL/hr at 17 0433 40 mcg/kg/min at 17 0433   • labetalol (NORMODYNE,TRANDATE) injection 10 mg  10 mg Q HOUR PRN Gabe Roblero M.D.   10 mg at 17 1435   • hydrALAZINE (APRESOLINE) injection 20 mg  20 mg Q4HRS PRN Gabe Roblero M.D.       • niCARdipine (CARDENE) 25 mg in   mL Infusion  2.5-15 mg/hr Continuous Gabe Roblero M.D.   Stopped at 04/19/17 1845   • NS infusion   Once PRN Gabe Roblero M.D.       • norepinephrine (LEVOPHED) 8 mg in  mL Infusion  1-30 mcg/min Continuous Gabe Roblero M.D.   Stopped at 04/19/17 1445   • trazodone (DESYREL) tablet 200 mg  200 mg HS PRN Nate Xiao M.D.       • duloxetine (CYMBALTA) capsule 60 mg  60 mg BID Nate Xiao M.D.   Stopped at 04/19/17 2100   • senna-docusate (PERICOLACE or SENOKOT S) 8.6-50 MG per tablet 2 Tab  2 Tab BID Nate Xiao M.D.   Stopped at 04/19/17 2100    And   • polyethylene glycol/lytes (MIRALAX) PACKET 1 Packet  1 Packet QDAY PRN Nate Xiao M.D.        And   • magnesium hydroxide (MILK OF MAGNESIA) suspension 30 mL  30 mL QDAY PRN Nate Xiao M.D.        And   • bisacodyl (DULCOLAX) suppository 10 mg  10 mg QDAY PRN Nate Xiao M.D.       • acetaminophen (TYLENOL) tablet 650 mg  650 mg Q6HRS PRN Nate Xiao M.D.       • Pharmacy Consult Request ...Pain Management Review   PRN Nate Xiao M.D.        And   • oxycodone immediate-release (ROXICODONE) tablet 2.5 mg  2.5 mg Q3HRS PREMILIA Xiao M.D.        And   • oxycodone immediate-release (ROXICODONE) tablet 5 mg  5 mg Q3HRS PRN Nate Xiao M.D.        And   • morphine (pf) 4 mg/ml injection 2 mg  2 mg Q3HRS PRN Nate Xiao M.D.       • ondansetron (ZOFRAN) syringe/vial injection 4 mg  4 mg Q4HRS PREMILIA Xiao M.D.       • ondansetron (ZOFRAN ODT) dispertab 4 mg  4 mg Q4HRS PRN Nate Xiao M.D.       • promethazine (PHENERGAN) tablet 12.5-25 mg  12.5-25 mg Q4HRS PRN Nate Xiao M.D.       • promethazine (PHENERGAN) suppository 12.5-25 mg  12.5-25 mg Q4HRS PRN Nate Xiao M.D.       • prochlorperazine (COMPAZINE) injection 5-10 mg  5-10 mg Q4HRS PRN Nate Xiao M.D.       • dextrose 5 % and 0.9 % NaCl with KCl 20 mEq infusion   Continuous Tyron Contreras M.D. 125 mL/hr at 04/19/17 1844     • MD ALERT...Do not administer  NSAIDS or ASPIRIN unless ORDERED By Neurosurgery 1 Each  1 Each PRN Tyron Contreras M.D.       • artificial tear ointment (REFRESH,LACRI-LUBE) 1 Application  1 Application Q8HRS Tyron Contreras M.D.   Stopped at 04/20/17 0600   • bisacodyl (DULCOLAX) suppository 10 mg  10 mg QDAY PRN Tyron Contreras M.D.       • fleet enema 133 mL  1 Each QDAY PRN Tyron Contreras M.D.       • acetaminophen (TYLENOL) suppository 975 mg  975 mg Q6HRS Tyron Contreras M.D.   Stopped at 04/19/17 1800   • Respiratory Care per Protocol   Continuous RT Evelyn Neely M.D.       • lactulose 20 GM/30ML solution 30 mL  30 mL Q24HRS PRN Evelyn Neely M.D.       • bisacodyl (DULCOLAX) suppository 10 mg  10 mg Q24HRS PRN Evelyn Neely M.D.       • fleet enema 133 mL  1 Each Once PRN Evelyn Neely M.D.       • chlorhexidine (PERIDEX) 0.12 % solution 15 mL  15 mL BID Evelyn Neely M.D.   15 mL at 04/19/17 2047   • lidocaine (XYLOCAINE) 1%  injection  1-2 mL Q30 MIN PRN Evelyn Neely M.D.       • MD ALERT...Adult ICU Electrolyte Replacement per Pharmacy Protocol   pharmacy to dose Evelyn Neely M.D.       • fentaNYL (SUBLIMAZE) injection 25 mcg  25 mcg Q HOUR PRN Evelyn Neely M.D.        Or   • fentaNYL (SUBLIMAZE) injection 50 mcg  50 mcg Q HOUR PRN Evelyn Neely M.D.        Or   • fentaNYL (SUBLIMAZE) injection 100 mcg  100 mcg Q HOUR PRN Evelyn Neely M.D.       • MD ALERT...Pharmacy to implement PROPOFOL CRITICAL CARE PROTOCOL 1 Each  1 Each PRN Evelyn Neely M.D.       • famotidine (PEPCID) tablet 20 mg  20 mg Q12HRS Evelyn Neely M.D.        Or   • famotidine (PEPCID) injection 20 mg  20 mg Q12HRS Evelyn Neely M.D.   20 mg at 04/19/17 2047   • midazolam (VERSED) premix 125 mg/125 mL NS  1 mg/hr Continuous Tyron Contreras M.D.   Stopped at 04/19/17 1830     Last reviewed on 4/19/2017  3:53 PM by Merari Pacheco, [facility]    Quality  Measures:  Medications reviewed, EKG  reviewed, Labs reviewed and Radiology images reviewed  Persaud catheter: Critically Ill - Requiring Accurate Measurement of Urinary Output      DVT Prophylaxis: Contraindicated - High bleeding risk  DVT prophylaxis - mechanical: SCDs  Ulcer prophylaxis: Yes  Antibiotics: Treating active infection/contamination beyond 24 hours perioperative coverage        Assessment/Plan:  Ventilator-dependent respiratory failure secondary to large intraventricular hemorrhage.   - Cont full ventilator support   - RT/O2 protocols   - No SBTs until mental status improves  Acute large intraventricular hemorrhage likely related to methamphetamine abuse.   - s/p ventriculostomy   - propofol for posturing   - SBP goals > 100 and < 140 with prn hydralazine and labetalol  Acute hydrocephalus related to intraventricular hemorrhage status post ventriculostomy placement.   - improved CT head after drain  Acute leukocytosis.   - likely related to aspiration   - cont unasyn  Hypokalemia.   - replete as needed  Hypomagnesia   - replete as needed  History of depression and psychiatric disorder, using Abilify.  Methamphetamine positive on urine drug screen.  Tobacco abuse.      Discussed patient condition and risk of morbidity and/or mortality with Family, RN, RT and Pharmacy.    The patient remains critically ill.  Critical care time = 34 minutes in directly providing and coordinating critical care and extensive data review.  No time overlap and excludes procedures.     Kusum MADRIGAL (Daryn), am scribing for, and in the presence of, Dr. Chin M.D..    Electronically signed by: Kusum Mckeon (Daryn), 4/20/2017    Dr. Chin MADRIGAL M.D. personally performed the services described in this documentation, as scribed by Kusum Mckeon in my presence, and it is both accurate and complete.

## 2017-04-20 NOTE — PROGRESS NOTES
Progress Note               Author: Tyron Zamorabert Date & Time created: 4/20/2017  1:06 PM     Interval History:  HD2 EVD 2 IVH  No acute events.  Remains intubated    Review of Systems:  Review of Systems   Unable to perform ROS: intubated       Physical Exam:  Physical Exam   Constitutional: She appears well-developed and well-nourished.   Neurological:   Pupils 3 mm right slightly laterally deviated.  Minimally reactive to light  Weak gag  Extends bilateral upper, lower extremities to sternal stimulation         Labs:  Recent Labs      04/19/17   1441  04/19/17   1727  04/20/17   0510   ISTATAPH  7.249*  7.308*  7.380*   ISTATAPCO2  51.3*  36.1  31.7   ISTATAPO2  399*  337*  97*   ISTATATCO2  24  19*  20   [id number]  100*  100*  97   ISTATARTHCO3  22.5  18.1  18.7   ISTATARTBE  -5*  -8*  -6*   ISTATTEMP  96.2 F  92.0 F  97.2 F   ISTATFIO2  100  60  30   ISTATSPEC  Arterial  Arterial  Arterial   ISTATAPHTC  7.268*  7.359*  7.391*   [id number]  392*  318*  92*     Recent Labs      04/19/17   1336   TROPONINI  <0.01     Recent Labs      04/19/17   1336  04/20/17   0430   SODIUM  137  142   POTASSIUM  3.2*  3.8   CHLORIDE  111  115*   CO2  18*  17*   BUN  9  6*   CREATININE  0.70  0.56   MAGNESIUM   --   1.6   PHOSPHORUS   --   2.6   CALCIUM  8.5  8.0*     Recent Labs      04/19/17   1336  04/20/17   0430   ALTSGPT  10  13   ASTSGOT  16  34   ALKPHOSPHAT  107*  83   TBILIRUBIN  0.2  0.3   GLUCOSE  235*  125*     Recent Labs      04/19/17   1336  04/20/17   0430   RBC  4.53  4.27   HEMOGLOBIN  13.3  12.6   HEMATOCRIT  40.7  38.4   PLATELETCT  279  182   PROTHROMBTM  12.6   --    APTT  28.0   --    INR  0.92   --      Recent Labs      04/19/17   1336  04/20/17   0430   WBC  15.3*  11.3*   NEUTSPOLYS  62.90  82.60*   LYMPHOCYTES  29.30  10.20*   MONOCYTES  5.40  6.30   EOSINOPHILS  1.10  0.20   BASOPHILS  0.60  0.30   ASTSGOT  16  34   ALTSGPT  10  13   ALKPHOSPHAT  107*  83   TBILIRUBIN  0.2  0.3      Hemodynamics:  Temp (24hrs), Av.7 °C (98 °F), Min:36.7 °C (98 °F), Max:36.7 °C (98 °F)  Temperature: 36.7 °C (98 °F), Monitored Temp: 36.6 °C (97.9 °F)  Pulse  Av.6  Min: 44  Max: 102Heart Rate (Monitored): (!) 101  Arterial BP: 146/79 mmHg, NIBP: 134/76 mmHg     Respiratory:  Saleem Vent Mode: APVCMV, Rate (breaths/min): 18, PEEP/CPAP: 8, FiO2: 30, P Peak (PIP): 19, P MEAN: 11 Respiration: 17, Pulse Oximetry: 100 %     Work Of Breathing / Effort: Vented  RUL Breath Sounds: Clear, RML Breath Sounds: Clear, RLL Breath Sounds: Diminished, AKIL Breath Sounds: Clear, LLL Breath Sounds: Diminished  Fluids:    Intake/Output Summary (Last 24 hours) at 17 1306  Last data filed at 17 1200   Gross per 24 hour   Intake 3948.04 ml   Output   3462 ml   Net 486.04 ml        GI/Nutrition:  Orders Placed This Encounter   Procedures   • Diet NPO     Standing Status: Standing      Number of Occurrences: 1      Standing Expiration Date:      Order Specific Question:  Type:     Answer:  Now [1]     Order Specific Question:  Restrict to:     Answer:  Strict [1]     Medical Decision Making, by Problem:  Active Hospital Problems    Diagnosis   • *Intraventricular hemorrhage (CMS-HCC) [I61.5]   • Methamphetamine dependence (CMS-HCC) [F15.20]   • Acute respiratory failure with hypoxia (CMS-HCC) [J96.01]   • Hyperglycemia [R73.9]   • Hypokalemia [E87.6]      cc @ 20 cm H2O    Plan:  No improvement in examination despite CSF diversion.  Continue EVD.  Will lower to 15 cm H2O    Labs reviewed, Radiology images reviewed and Medications reviewed  Persaud catheter: Critically Ill - Requiring Accurate Measurement of Urinary Output

## 2017-04-20 NOTE — CARE PLAN
Problem: Mechanical Ventilation  Goal: Safe management of artificial airway and ventilation  Outcome: PROGRESSING AS EXPECTED  Intervention: Suctioning and care of ET/Trach tube  Continuous pulse oximetry in use. Collaboration with RT. Head of bed elevated. chlorahexadine oral solution administered. Pt suctioned as needed. Oral care provided Q 2 hours. Peptic ulcer prophylaxis in use. Sequentials in use. ambu bag at bedside.       Problem: Hemodynamic Status  Goal: Vital Signs and Fluid Balance Management  Outcome: PROGRESSING AS EXPECTED  Intervention: Hemodynamic Monitoring  Continuous pulse oximetry in use. Continuous cardiac monitoring in use. Cardiac leads changed. Art line calibrated. Art line correlating with blood pressure cuff. I/O's monitored. Daily weights taken.

## 2017-04-20 NOTE — H&P
HOSPITAL MEDICINE HISTORY/ PHYSICAL    Date & Time note created:    2017   10:06 PM       Patient ID:   Name: Marly Vega YOB: 1970. Age: 46 y.o. female. MRN: [id number]    Admitting Attending:  Nate Xiao     PCP : Jocelin Urias M.D.        Chief Complaint:       Brain bleed    History of Present Illness:    Gary is a 46 y.o. female w/h/o asthma, depression who presents with brain bleed. Patient was with her cousin Lila today. Patient walked over the couch and started having some malaise. She had some nausea and dry heaving. That she sat down. Because she did not look good the cousin got her a cold washcloth. The patient then collapsed on the couch. The cousin waited for a while to see if she would get better. However she continued to be out of it and then was eventually not responsive. She was simply snoring. Best a cousin was not able to wake her up so she had the paramedics bring her into the hospital.    Review of Systems:    Has headache and nausea. Patient is unable to answer further review of systems questions  Please see HPI, all other systems were reviewed and are negative (AMA/CMS criteria)              Past Medical/ Family / Social history (PFSH):   Past Medical History   Diagnosis Date   • Psychiatric disorder    • ASTHMA    • Depression    • Dental disorder    • Unspecified disorder of thyroid      Past Surgical History   Procedure Laterality Date   • Tonsillectomy     • Other abdominal surgery          • Exploratory laparotomy       for ovarian cysts    • Melody by laparoscopy  3/12/2011     Performed by GEO PICKETT at SURGERY [facility]   • Hysterectomy laparoscopy     • Hernia repair     • Laparoscopy  2013     Performed by Geo Pickett M.D. at [facility]   • Laparoscopy  2014     Performed by Geo Pickett M.D. at [facility]   • Exploratory laparotomy  2014     Performed by Geo Pickett M.D.  "at SURGERY [facility]   • Appendectomy  9/4/2014     Performed by Jose Nuñez M.D. at SURGERY [facility]   • Ventriculostomy Right 4/19/2017     Procedure: VENTRICULOSTOMY RIGHT FRONTAL;  Surgeon: Tyron Contreras M.D.;  Location: SURGERY [facility];  Service:      Current Outpatient Medications:  No current facility-administered medications on file prior to encounter.     No current outpatient prescriptions on file prior to encounter.     Medication Allergy/Sensitivities:  Allergies   Allergen Reactions   • Amoxicillin    • Cortisone      Redness at site of injection   • Doxycycline Rash   • Keflex    • Pcn [Penicillins] Vomiting     Family History:  Family History   Problem Relation Age of Onset   • Diabetes     • Hypertension     • Cancer     • Lung Disease        Social History:  Social History   Substance Use Topics   • Smoking status: Current Every Day Smoker -- 0.50 packs/day for 30 years     Types: Cigarettes   • Smokeless tobacco: None      Comment: 0.5ppd   • Alcohol Use: No     #################################################################  Physical Exam:   Vitals/ General Appearance:   Weight/BMI: Body mass index is 22.71 kg/(m^2).  Pulse 61, temperature 36.7 °C (98 °F), resp. rate 18, height 1.702 m (5' 7\"), weight 65.772 kg (145 lb), SpO2 100 %.   Filed Vitals:    04/19/17 1900 04/19/17 2000 04/19/17 2100 04/19/17 2151   Pulse: 52 57 61    Temp:       Resp: 18 18 18    Height:       Weight:       SpO2: 99% 100% 100% 100%    Oxygen Therapy:  Pulse Oximetry: 100 %, FIO2%: 30, O2 Delivery: Ventilator    Constitutional:  thin, frail  HENMT: Normocephalic, atraumatic, b/l ears normal, nose normal  Eyes:  EOMI, conjunctiva normal, no discharge  Neck: no tracheal deviation, supple  Cardiovascular: tachycardic, normal rhythm, no murmurs, no rubs or gallops; no cyanosis, clubbing or edema  Lungs: Respiratory effort is normal, normal breath sounds, breath sounds clear to auscultation " b/l, no rales, rhonchi or wheezing  Abdomen: soft, non-tender, no guarding or rebound  Skin: warm, dry, no erythema, no rash  Neurologic: Intubated and sedated  Psychiatric: Unable to assess for anxiety or depression    #################################################################  Lab Data Review:    Objective  Recent Results (from the past 24 hour(s))   CBC WITH DIFFERENTIAL    Collection Time: 04/19/17  1:36 PM   Result Value Ref Range    WBC 15.3 (H) 4.8 - 10.8 K/uL    RBC 4.53 4.20 - 5.40 M/uL    Hemoglobin 13.3 12.0 - 16.0 g/dL    Hematocrit 40.7 37.0 - 47.0 %    MCV 89.8 81.4 - 97.8 fL    MCH 29.4 27.0 - 33.0 pg    MCHC 32.7 (L) 33.6 - 35.0 g/dL    RDW 43.8 35.9 - 50.0 fL    Platelet Count 279 164 - 446 K/uL    MPV 10.3 9.0 - 12.9 fL    Neutrophils-Polys 62.90 44.00 - 72.00 %    Lymphocytes 29.30 22.00 - 41.00 %    Monocytes 5.40 0.00 - 13.40 %    Eosinophils 1.10 0.00 - 6.90 %    Basophils 0.60 0.00 - 1.80 %    Immature Granulocytes 0.70 0.00 - 0.90 %    Nucleated RBC 0.00 /100 WBC    Neutrophils (Absolute) 9.63 (H) 2.00 - 7.15 K/uL    Lymphs (Absolute) 4.48 1.00 - 4.80 K/uL    Monos (Absolute) 0.83 0.00 - 0.85 K/uL    Eos (Absolute) 0.17 0.00 - 0.51 K/uL    Baso (Absolute) 0.09 0.00 - 0.12 K/uL    Immature Granulocytes (abs) 0.11 0.00 - 0.11 K/uL    NRBC (Absolute) 0.00 K/uL   COMP METABOLIC PANEL    Collection Time: 04/19/17  1:36 PM   Result Value Ref Range    Sodium 137 135 - 145 mmol/L    Potassium 3.2 (L) 3.6 - 5.5 mmol/L    Chloride 111 96 - 112 mmol/L    Co2 18 (L) 20 - 33 mmol/L    Anion Gap 8.0 0.0 - 11.9    Glucose 235 (H) 65 - 99 mg/dL    Bun 9 8 - 22 mg/dL    Creatinine 0.70 0.50 - 1.40 mg/dL    Calcium 8.5 8.5 - 10.5 mg/dL    AST(SGOT) 16 12 - 45 U/L    ALT(SGPT) 10 2 - 50 U/L    Alkaline Phosphatase 107 (H) 30 - 99 U/L    Total Bilirubin 0.2 0.1 - 1.5 mg/dL    Albumin 3.7 3.2 - 4.9 g/dL    Total Protein 6.4 6.0 - 8.2 g/dL    Globulin 2.7 1.9 - 3.5 g/dL    A-G Ratio 1.4 g/dL   PROTHROMBIN  TIME    Collection Time: 17  1:36 PM   Result Value Ref Range    PT 12.6 12.0 - 14.6 sec    INR 0.92 0.87 - 1.13   APTT    Collection Time: 17  1:36 PM   Result Value Ref Range    APTT 28.0 24.7 - 36.0 sec   TROPONIN    Collection Time: 17  1:36 PM   Result Value Ref Range    Troponin I <0.01 0.00 - 0.04 ng/mL   TRIGLYCERIDE    Collection Time: 17  1:36 PM   Result Value Ref Range    Triglycerides 88 0 - 149 mg/dL   ESTIMATED GFR    Collection Time: 17  1:36 PM   Result Value Ref Range    GFR If African American >60 >60 mL/min/1.73 m 2    GFR If Non African American >60 >60 mL/min/1.73 m 2   COD (ADULT)    Collection Time: 17  1:36 PM   Result Value Ref Range    ABO Grouping Only O     Rh Grouping Only POS     Antibody Screen-Cod NEG    EKG (NOW)    Collection Time: 17  1:38 PM   Result Value Ref Range    Report       [facility] Emergency Dept.    Test Date:  2017  Pt Name:    CHARLETTE RIGGS               Department: ER  MRN:        [id number]                      Room:       [facility]  Gender:     F                            Technician: [id number]  :        1970                   Requested By:ROHAN SESAY  Order #:    [id number]                    Reading MD: ROHAN SESAY MD    Measurements  Intervals                                Axis  Rate:       89                           P:          80  SD:         152                          QRS:        75  QRSD:       74                           T:          53  QT:         416  QTc:        507    Interpretive Statements  SINUS RHYTHM  BORDERLINE PROLONGED QT INTERVAL  Compared to ECG 2013 10:35:49  No significant changes    Electronically Signed On 2017 14:37:15 PDT by ROHAN SESAY MD     BLOOD CULTURE,HOLD    Collection Time: 17  2:27 PM   Result Value Ref Range    Blood Culture Hold Collected    ISTAT ARTERIAL BLOOD GAS    Collection Time: 17  2:41 PM   Result Value Ref  Range    Ph 7.249 (LL) 7.400 - 7.500    Pco2 51.3 (HH) 26.0 - 37.0 mmHg    Po2 399 (H) 64 - 87 mmHg    Tco2 24 20 - 33 mmol/L    S02 100 (H) 93 - 99 %    Hco3 22.5 17.0 - 25.0 mmol/L    BE -5 (L) -4 - 3 mmol/L    Body Temp 96.2 F degrees    O2 Therapy 100 %    Ph Temp Vinh 7.268 (LL) 7.400 - 7.500    Pco2 Temp Co 48.4 (H) 26.0 - 37.0 mmHg    Po2 Temp Cor 392 (H) 64 - 87 mmHg    Specimen Arterial    URINE DRUG SCREEN    Collection Time: 04/19/17  2:48 PM   Result Value Ref Range    Amphetamines Urine Positive (A) Negative    Barbiturates Negative Negative    Benzodiazepines Negative Negative    Cocaine Metabolite Negative Negative    Methadone Negative Negative    Ecstasy Negative Negative    Opiates Negative Negative    Oxycodone Negative Negative    Phencyclidine -Pcp Negative Negative    Propoxyphene Negative Negative    Cannabinoid Metab Negative Negative   ISTAT ARTERIAL BLOOD GAS    Collection Time: 04/19/17  5:27 PM   Result Value Ref Range    Ph 7.308 (L) 7.400 - 7.500    Pco2 36.1 26.0 - 37.0 mmHg    Po2 337 (H) 64 - 87 mmHg    Tco2 19 (L) 20 - 33 mmol/L    S02 100 (H) 93 - 99 %    Hco3 18.1 17.0 - 25.0 mmol/L    BE -8 (L) -4 - 3 mmol/L    Body Temp 92.0 F degrees    O2 Therapy 60 %    Ph Temp Vinh 7.359 (L) 7.400 - 7.500    Pco2 Temp Co 30.8 26.0 - 37.0 mmHg    Po2 Temp Cor 318 (H) 64 - 87 mmHg    Specimen Arterial    SPECIFIC GRAVITY    Collection Time: 04/19/17  6:01 PM   Result Value Ref Range    Specific Gravity 1.022 <1.035       (click the triangle to expand results)  My interpretation of lab results:   U tox positive for amphetamines, potassium 3.2, bicarb 18, WBC 15.3  Imaging/Procedures Review:    CT-CSPINE WITHOUT PLUS RECONS   Final Result      1.  No acute cervical spine fracture.   2.  Intraventricular and subarachnoid hemorrhage posterior fossa and proximal cervical spinal canal.      CT-CTA HEAD WITH & W/O-POST PROCESS   Final Result      1.  Intraparenchymal and intraventricular hemorrhage  as discussed on prior CT head.      2.  No acute thrombus or aneurysm identified.      DX-CHEST-PORTABLE (1 VIEW)   Final Result      Endotracheal tube tip projects approximately 8.2 cm above the [place]in[place].      CT-HEAD W/O   Final Result      1.  Hemorrhage centered in the left thalamus with intraventricular extension.      2.  Ventricular dilatation with transependymal flow of CSF.      3.  Basilar cistern effacement and crowding the foramen magnum         Comment: Results discussed with Dr. Roblero at 2:15 PM         DX-CHEST-PORTABLE (1 VIEW)    (Results Pending)     EKG:   per my independant read:  QTc: 507, HR: 89, Normal Sinus Rhythm, no ST/T changes    Assessment and Plan:      1. Intraventricular and subarachnoid hemorrhage  - Involves posterior fossa and proximal cervical spinal canal  - Neurosurg Connor consulted  - Taken to the OR for ventriculostomy  2. Hypokalemia  - Repleting with potassium and IV fluids  3. Chronic pain and depression  - Continue duloxetine  4. Acute hypoxic respiratory failure  - currently on ventilator  - Pulm Chin consulted  5. Prognosis  - Poor, will continue to discuss with family  6. Prophylaxis: SCDs  7. Code: Full code per cousin Lila who is at bedside. Patient also has a son but she is not getting along with him at this time. Patient also has a boyfriend was on his way.  8. Dispo: She will be admitted to inpatient for management that is expected to take greater than 2 midnights

## 2017-04-20 NOTE — OP REPORT
Marly Vega   1970  MRN [id number]     Procedure:  Right frontal ventriculostomy    Surgeon:  Tyron Contreras MD PhD  Assistant:  None    Anesthesia:  GETA    Diagnosis:  Intraventricular hemorrhage with obstructive hydrocephalus    Indication:  46 year old female with obstructive hydrocephalus secondary to significant intraventricular hemorrhage.  Right frontal ventriculostomy is being performed for CSF diversion.  No immediate family is avaialble, the procedure is being performed on an emergent basis.    Procedure:  The patient was identified in the ED and taken to the operating room.  She was positioned supine with her head in a foam headholder.  She was administered 50 g mannitol and 900 mg Clindamycin prior to skin incision.  Her head was prepped with hair clipping, chorhexidine, betadine scrub and Chloroprep.  Sterile drapes were applied.  The planned linear incision was infiltrated with 1% marcaine/epinephrine.  The skin was incised sharply and dissection carried to the skull using monopolar cautery.  A Weitlaner self retaining retractor was placed.  A craniostomy was created using the high speed drill/AM8 bit.  The dura was cauterized with bipolar cautery, opened in a cruciate manner with an 11 blade scalpel, and the dural leaflets cauterized with bipolar cautery.  A standard 35 cm Integra ventricular catheter was advanced to 6 cm depth using standard landmarks.  Bloody CSF under increased pressure was encountered.  The catheter was tunneled subcutaneously using the supplied trocar, and secured to the skin using 2-0 Nylon suture.  The catheter was attached to a [employer] system with transducer attached.  Gelfoam was placed in the craniostomy.  The dermis was reapproximated with 3-0 Vicryl suture in an inverted interrupted manner.  The skin was reapproximated with 4-0 Nylon suture in a running manner.  Bacitracin ointment and Xeroform gauze were placed over the catheter exit site and skin  incision.  A sterile dressing was placed.  Final counts were correct.    Findings:  Bloody CSF under increased pressure    Specimen:  None    Drain:  Right frontal EVD    EBL:  Minimal    Complication: none apparent at end of procedure.

## 2017-04-20 NOTE — DISCHARGE PLANNING
Care Transition Team Assessment    SW met with pt's significant other Duke Zelaya and her cousin Lila Yadav at bedside this date for assessment and to discuss NOK/medical decision making.  Duke reports pt has an adult son who lives in [place], name is Maggie Vides, Duke does not have his phone number.  Pt also has a sister in [place], Kathryn Benitez [phone].  Duke reports pt is estranged from her sister and has not spoken with her since November 2015. SW explained NOK process and indicated SW will attempt a search for son's contact number and will also attempt phone contact with her sister.  Duke and Lila identify themselves as pt's primary support contacts and would like to be updated re: pt's POC.  Duke plans on being at bedside daily after 11am as he goes to work in the mornings.      Information Source  Orientation : Unable to Assess  Information Given By: Significant Other  Informant's Name: Duke Zelaya  Who is responsible for making decisions for patient? : Legal next of kin    Readmission Evaluation  Is this a readmission?: No    Elopement Risk  Legal Hold: No  Ambulatory or Self Mobile in Wheelchair: No-Not an Elopement Risk  Elopement Risk: Not at Risk for Elopement    Interdisciplinary Discharge Planning  Does Admitting Nurse Feel This Could be a Complex Discharge?: Yes  Primary Care Physician: Dr. Jocelin Urias  Lives with - Patient's Self Care Capacity: Significant Other  Patient or legal guardian wants to designate a caregiver (see row info): No  Support Systems: Spouse / Significant Other  Housing / Facility: 1 Story Apartment / Condo  Able to Return to Previous ADL's: Other  Prior Services: None  Patient Expects to be Discharged to:: Unable to determine at this time  Assistance Needed: Unknown at this Time    Discharge Preparedness  What is your plan after discharge?: Uncertain - pending medical team collaboration  What are your discharge supports?: Other (comment)  Prior Functional Level:  Independent with Activities of Daily Living  Difficulity with ADLs: None  Difficulity with IADLs: None    Functional Assesment  Prior Functional Level: Independent with Activities of Daily Living    Finances  Financial Barriers to Discharge: Yes  Source of Income: Employed  Prescription Coverage: No  Prescription Coverage Comments: Per family pt recently applied for insurance that was to be effecitve 4/21/2017, coverage unknown         Values / Beliefs / Concerns  Values / Beliefs Concerns : No    Advance Directive  Advance Directive?: None    Domestic Abuse  Have you ever been the victim of abuse or violence?: Not Sure  Physical Abuse or Sexual Abuse: Unable to Assess due to Patient Condition  Verbal Abuse or Emotional Abuse: Unable to Assess due to Patient Condition  Possible Abuse Reported to:: Not Applicable    Psychological Assessment  History of Substance Abuse: Methamphetamine, Prescription opioids  Date Last Used - Methamphetamine: To family's knowledge 5 years ago  Date Last Used - Prescription Opioids: To family's knowledge most recent use 5 years ago  Substance Abuse Comments: SO/Family appeared unaware of pt's recent drug use (UDS positive for methamphetamine)  History of Psychiatric Problems: Yes  Non-compliant with Treatment: Yes  Newly Diagnosed Illness: No    Discharge Risks or Barriers  Discharge risks or barriers?: Uninsured / underinsured, Substance abuse, Mental health  Patient risk factors: Substance abuse    Anticipated Discharge Information  Anticipated discharge disposition: Discharge needs currently unknown  Discharge Address: If home: [address] [place] [address] [address] [place] [id number]  Discharge Contact Phone Number: [phone]

## 2017-04-21 NOTE — PROGRESS NOTES
Progress Note               Author: Tyron Contreras Date & Time created: 4/21/2017  10:03 AM     Interval History:  EVD for IVH  No acute events  ICP 11-17 mmHG    Review of Systems:  Review of Systems   Unable to perform ROS: intubated       Physical Exam:  Physical Exam   Neurological:   Intubated  No eye opening  Not following commands  Pupils 3mm non-reactive to light  Side to side eye movements without tracking  Minimal extremity extension to sternal rub  EVD functioning  CSF clear/red       Labs:  Recent Labs      04/19/17   1727  04/20/17   0510  04/21/17   0512   ISTATAPH  7.308*  7.380*  7.379*   ISTATAPCO2  36.1  31.7  33.1   ISTATAPO2  337*  97*  109*   ISTATATCO2  19*  20  21   [id number]  100*  97  98   ISTATARTHCO3  18.1  18.7  19.6   ISTATARTBE  -8*  -6*  -5*   ISTATTEMP  92.0 F  97.2 F  98.2 F   ISTATFIO2  60  30  30   ISTATSPEC  Arterial  Arterial  Arterial   ISTATAPHTC  7.359*  7.391*  7.382*   [id number]  318*  92*  107*     Recent Labs      04/19/17   1336   TROPONINI  <0.01     Recent Labs      04/20/17   0430  04/20/17   1100  04/21/17   0435   SODIUM  142  137  144   POTASSIUM  3.8  3.7  3.9   CHLORIDE  115*  112  116*   CO2  17*  20  18*   BUN  6*  5*  9   CREATININE  0.56  0.58  0.52   MAGNESIUM  1.6   --   2.1   PHOSPHORUS  2.6   --   2.4*   CALCIUM  8.0*  8.6  8.7     Recent Labs      04/19/17   1336  04/20/17   0430  04/20/17   1100  04/21/17   0435   ALTSGPT  10  13   --    --    ASTSGOT  16  34   --    --    ALKPHOSPHAT  107*  83   --    --    TBILIRUBIN  0.2  0.3   --    --    GLUCOSE  235*  125*  124*  128*     Recent Labs      04/19/17   1336  04/20/17   0430  04/21/17   0435   RBC  4.53  4.27  4.30   HEMOGLOBIN  13.3  12.6  12.8   HEMATOCRIT  40.7  38.4  39.4   PLATELETCT  279  182  164   PROTHROMBTM  12.6   --    --    APTT  28.0   --    --    INR  0.92   --    --      Recent Labs      04/19/17   1336  04/20/17   0430  04/21/17   0435   WBC  15.3*  11.3*  13.1*   NEUTSPOLYS   62.90  82.60*   --    LYMPHOCYTES  29.30  10.20*   --    MONOCYTES  5.40  6.30   --    EOSINOPHILS  1.10  0.20   --    BASOPHILS  0.60  0.30   --    ASTSGOT  16  34   --    ALTSGPT  10  13   --    ALKPHOSPHAT  107*  83   --    TBILIRUBIN  0.2  0.3   --      Hemodynamics:  No data recorded.  Monitored Temp: 36.9 °C (98.4 °F)  Pulse  Av.8  Min: 36  Max: 127Heart Rate (Monitored): 89  Arterial BP: (!) 95/69 mmHg, NIBP: 126/60 mmHg     Respiratory:  Saleem Vent Mode: APVCMV, Rate (breaths/min): 18, PEEP/CPAP: 8, FiO2: 30, P Peak (PIP): 17, P MEAN: 11 Respiration: 18, Pulse Oximetry: 99 %     Work Of Breathing / Effort: Vented  RUL Breath Sounds: Clear, RML Breath Sounds: Diminished, RLL Breath Sounds: Diminished, AKIL Breath Sounds: Clear, LLL Breath Sounds: Diminished  Fluids:    Intake/Output Summary (Last 24 hours) at 17 1003  Last data filed at 17 0600   Gross per 24 hour   Intake 2057.13 ml   Output 1106.5 ml   Net 950.63 ml     Weight: 59.7 kg (131 lb 9.8 oz)  GI/Nutrition:  Orders Placed This Encounter   Procedures   • Diet NPO     Standing Status: Standing      Number of Occurrences: 1      Standing Expiration Date:      Order Specific Question:  Type:     Answer:  Now [1]     Order Specific Question:  Restrict to:     Answer:  Strict [1]     Medical Decision Making, by Problem:  Active Hospital Problems    Diagnosis   • *Intraventricular hemorrhage (CMS-HCC) [I61.5]   • Methamphetamine dependence (CMS-[facility]) [F15.20]   • Acute respiratory failure with hypoxia (CMS-[facility]) [J96.01]   • Hyperglycemia [R73.9]   • Hypokalemia [E87.6]       Plan:  No neurologic improvement with CSF diversion.  Overall outcome poor given above  Continue CSF diversion.    Labs reviewed, Radiology images reviewed and Medications reviewed  Persaud catheter: Critically Ill - Requiring Accurate Measurement of Urinary Output

## 2017-04-21 NOTE — CARE PLAN
Problem: Skin Integrity  Goal: Risk for impaired skin integrity will decrease  Outcome: PROGRESSING AS EXPECTED  Intervention: Assess risk factors for impaired skin integrity and/or pressure ulcers  Pt's skin assessed. Pillows in use for support and positioning. Pillows in use to float heels. Tube feeds at goal. Pt turned Q 2 hours.       Problem: Mechanical Ventilation  Goal: Safe management of artificial airway and ventilation  Outcome: PROGRESSING AS EXPECTED  Intervention: Suctioning and care of ET/Trach tube  Continuous pulse oximetry in use. Collaboration with RT. Head of bed elevated. chlorahexadine oral solution administered. Pt suctioned as needed. Oral care provided Q 2 hours. Peptic ulcer prophylaxis in use. Sequentials in use. ambu bag at bedside.

## 2017-04-21 NOTE — PROGRESS NOTES
Hospital Medicine Progress Note, Adult, Complex               Author: Reggie STONE Ferny Marco A Date & Time created: 4/21/2017  9:30 AM     Interval History:  Pt admitted with ICH, positive urine screen for meth    No overnight events, pupils non reactive ICP 11-17 hemodynamically stable    Review of Systems:  Review of Systems   Unable to perform ROS: medical condition       Physical Exam:  Physical Exam   Constitutional: She appears well-developed.   HENT:   Head: Normocephalic and atraumatic.   Mouth/Throat: No oropharyngeal exudate.   Eyes: Right eye exhibits no discharge. Left eye exhibits no discharge. No scleral icterus.   Pupils non reactive   Neck: Neck supple. No JVD present. No tracheal deviation present.   Cardiovascular: Normal rate and regular rhythm.  Exam reveals no gallop and no friction rub.    No murmur heard.  Pulmonary/Chest: Effort normal. No stridor. No respiratory distress. She has no decreased breath sounds. She has no wheezes. She has rales. She exhibits no tenderness and no crepitus.   Abdominal: Soft. Bowel sounds are normal. She exhibits no distension. There is no tenderness. There is no rebound and no guarding.   Musculoskeletal: She exhibits no edema or tenderness.   Neurological:   Pupils fixed, decorticate posturing, no gag   Skin: Skin is warm and dry. She is not diaphoretic. No cyanosis. Nails show no clubbing.   Psychiatric: Cognition and memory are impaired. She is noncommunicative.   Nursing note and vitals reviewed.      Labs:  Recent Labs      04/19/17   1727  04/20/17   0510  04/21/17   0512   ISTATAPH  7.308*  7.380*  7.379*   ISTATAPCO2  36.1  31.7  33.1   ISTATAPO2  337*  97*  109*   ISTATATCO2  19*  20  21   [id number]  100*  97  98   ISTATARTHCO3  18.1  18.7  19.6   ISTATARTBE  -8*  -6*  -5*   ISTATTEMP  92.0 F  97.2 F  98.2 F   ISTATFIO2  60  30  30   ISTATSPEC  Arterial  Arterial  Arterial   ISTATAPHTC  7.359*  7.391*  7.382*   [id number]  318*  92*  107*     Recent Labs       17   133   TROPONINI  <0.01     Recent Labs      17   0430  17   1100  17   043   SODIUM  142  137  144   POTASSIUM  3.8  3.7  3.9   CHLORIDE  115*  112  116*   CO2  17*  20  18*   BUN  6*  5*  9   CREATININE  0.56  0.58  0.52   MAGNESIUM  1.6   --   2.1   PHOSPHORUS  2.6   --   2.4*   CALCIUM  8.0*  8.6  8.7     Recent Labs      17   1336  17   0430  17   1100  17   0435   ALTSGPT  10  13   --    --    ASTSGOT  16  34   --    --    ALKPHOSPHAT  107*  83   --    --    TBILIRUBIN  0.2  0.3   --    --    GLUCOSE  235*  125*  124*  128*     Recent Labs      17   13317   0430  17   0435   RBC  4.53  4.27  4.30   HEMOGLOBIN  13.3  12.6  12.8   HEMATOCRIT  40.7  38.4  39.4   PLATELETCT  279  182  164   PROTHROMBTM  12.6   --    --    APTT  28.0   --    --    INR  0.92   --    --      Recent Labs      176  17   0430  175   WBC  15.3*  11.3*  13.1*   NEUTSPOLYS  62.90  82.60*   --    LYMPHOCYTES  29.30  10.20*   --    MONOCYTES  5.40  6.30   --    EOSINOPHILS  1.10  0.20   --    BASOPHILS  0.60  0.30   --    ASTSGOT  16  34   --    ALTSGPT  10  13   --    ALKPHOSPHAT  107*  83   --    TBILIRUBIN  0.2  0.3   --            Hemodynamics:  No data recorded.  Monitored Temp: 36.9 °C (98.4 °F)  Pulse  Av.7  Min: 36  Max: 127Heart Rate (Monitored): 94  Arterial BP: 135/73 mmHg, NIBP: 128/60 mmHg     Respiratory:  Saleem Vent Mode: APVCMV, Rate (breaths/min): 18, PEEP/CPAP: 8, FiO2: 30, P Peak (PIP): 17, P MEAN: 11 Respiration: 18, Pulse Oximetry: 99 %     Work Of Breathing / Effort: Vented  RUL Breath Sounds: Clear, RML Breath Sounds: Diminished, RLL Breath Sounds: Diminished, AKIL Breath Sounds: Clear, LLL Breath Sounds: Diminished  Fluids:    Intake/Output Summary (Last 24 hours) at 17 0930  Last data filed at 17 0600   Gross per 24 hour   Intake 2233.58 ml   Output 1216.5 ml   Net 1017.08 ml     Weight:  59.7 kg (131 lb 9.8 oz)  GI/Nutrition:  Orders Placed This Encounter   Procedures   • Diet NPO     Standing Status: Standing      Number of Occurrences: 1      Standing Expiration Date:      Order Specific Question:  Type:     Answer:  Now [1]     Order Specific Question:  Restrict to:     Answer:  Strict [1]     Medical Decision Making, by Problem:  Active Hospital Problems    Diagnosis   • *Intraventricular hemorrhage (CMS-HCC) [I61.5]  -s/p EVD  -overall prognosis for meaningful recovery is extremely guarded  -consider repeat CT will defer to NSGY   -sister coming from [place] today, unable to locate son, will ask for palliative care eval   • Methamphetamine dependence (CMS-HCC) [F15.20]   • Acute respiratory failure with hypoxia (CMS-[facility]) [J96.01]  -vent management per dr Neely   • Hyperglycemia [R73.9]  -monitor cbg's   • Hypokalemia [E87.6]  Hypophophatemia  -replete and monitor           Labs reviewed, Medications reviewed and Radiology images reviewed  Persaud catheter: Unconscious / Sedated Patient on a Ventilator      DVT Prophylaxis: Contraindicated - High bleeding risk  DVT prophylaxis - mechanical: SCDs  Ulcer prophylaxis: Yes

## 2017-04-21 NOTE — CARE PLAN
Problem: Venous Thromboembolism (VTW)/Deep Vein Thrombosis (DVT) Prevention:  Goal: Patient will participate in Venous Thrombosis (VTE)/Deep Vein Thrombosis (DVT)Prevention Measures  SCDs in place throughout shift. Pharmacologic prophylaxis contraindicated due to disease process.     Problem: Skin Integrity  Goal: Risk for impaired skin integrity will decrease  Repositioning patient q2hr. Ensuring no medical devices causing pressure. Repositioning of head PRN to prevent pressure on one side of face. No s/s skin breakdown.

## 2017-04-21 NOTE — PROGRESS NOTES
Cortrak Placement    Tube Team verified patient name and medical record number prior to tube placement.  Cortrak tube (55 inches, 10 Slovak) placed at 85 cm in right nare.  Per Cortrak picture, tube appears to be in the small bowel.  Nursing Instructions: Awaiting KUB to confirm placement before use for medications or feeding. Once placement confirmed, flush tube with 30 ml of water, and then remove and save stylet, in patient medication drawer.

## 2017-04-21 NOTE — DIETARY
"  Nutrition Support Assessment - Female    Marly Vega is a 46 y.o. female with admitting DX of:   1)  Cerebral hemorrhage  2)  Respiratory failure - on vent    Pertinent History: meth abuse, psychiatric disorder, asthma, depression  Allergies: Amoxicillin; Cortisone; Doxycycline; Keflex; and Pcn    Height: 170.2 cm (5' 7\")  Weight: 59.7 kg (131 lb 9.8 oz)  Weight to Use in Calculations: 59.7 kg (131 lb 9.8 oz)  Ideal Body Weight: 61.236 kg (135 lb)  Percent Ideal Body Weight: 97.5  Body mass index is 20.61 kg/(m^2).    Pertinent Labs: glucose 128, phosphorus 2.4   Last BM: 04/21/17  Pertinent Medications: pepcid, senokot, sodium phosphate  Pertinent Fluids: NS @ 75 ml/hr, propofol @ 15.8 ml/hr (417 kcals/day)  Surgery / Procedures: na  Skin: no skin breakdown noted     Estimated Needs: MSJ x 1.2   Total Calories / day: 1500 - 1650 kcals (25 - 27 kcals/kg)   Total Grams Protein / day: 72 - 84 g (1.2 - 1.4 g/kg)  Total Fluids ml / day: 1794.2 ml        Assessment / Evaluation:    1) pt on vent in need of nutrition support - Fibersource TF started last night per protocol.  Will change to Diabetisource for increased protein and decreased CHO.   2) fluid needs to be managed by MD - pt is currently 2.5 liters fluid positive  3) hypophosphatemia - being replaced    Plan / Recommendation:    1) change TF to Diabetisource with goal 55 ml/hr to provide 1584 kcals, 79 g protein, 1072 ml H20/day  2) monitor daily weights for adequacy of nutrition and hydration status  3) no need to adjust TF rate on current propofol - will monitor daily  "

## 2017-04-21 NOTE — CONSULTS
Reason for PC Consult: Advance Care Planning    Consulted by:   Dr. Ferny Strickland    Assessment:  General:   46 year old female admitted for cerebral hemorrhage on 4/19/17. Pt has a history of psych disorder, asthma, depression, dental disorder, and unspecified thyroid disorder. Pt tested positive for meth use upon arrival to ER. Pt had nausea and dry heaving, she sat down and her cousin provided a cold washcloth. Pt collapsed on the couch, then became unresponsive. Pt's cousin waited for a while to see if pt would get better, once she became unresponsive pt's cousin called EMS.     Dyspnea: Unable to determine, 99% on 30% FiO2   Last BM:  (PTA)-    Pain: Unable to determine, Fentanyl  mcg Q1 hour PRN  Depression: Unable to determine, pt has history of depression    Spiritual:  Is Confucianism or spirituality important for coping with this illness? Unable to determine  Has a  or spiritual provider visit been requested? Unable to determine    Palliative Performance Scale: 10%    Advance Directive: None on File  DPOA: None on FIle, ZEKE Peralta ([phone]) Pt does have one biological son, clinical team unable to contact at this time.   POLST: None on File    Code Status: Full    Outcome:  PC RN visited pt at bedside, no family present. Pt is currently intubated and minimal arousal. Per bedside RN pt valentine NOT have Gag reflex, intermittent cough, and slight corneal reflex. No pupil response and POSITIVE babinski. Prognosis is poor. Pt currently has a ventric drain in place with bright red blood. Pt sister Kathryn, coming from [place] today (4/21/17), pt has a significant other, Duke, and a cousin Lila who have been at bedside. PC RN unable to interact with pt due to clinical status, will contact family and arrange a family meeting with Dr. Contreras, Neurologist, once family is at bedside. Bedside RN will contact PC RN once family arrives.     Updated:   Dr. Ferny Strickland, Bedside RN    Plan:   Meet with family once they  arrive.     Thank you for allowing Palliative Care to participate in this patient's care. Please feel free to call [phone] with any questions or concerns.

## 2017-04-21 NOTE — PALLIATIVE CARE
Palliative Care follow-up  Called Willis for updates, will call Dr. Hernandez to arrange for family meeting.     Called Dr. Hernandez through his office number ([phone]). He will be at bedside to have conference at 0800 on Monday 4/24/17.      Updated:   Willis, sharri RN    Plan:   Family conference for Monday 4/24/17 at 0800. PC to continue to follow.    Thank you for allowing Palliative Care to participate in this patient's care. Please feel free to call [phone] with any questions or concerns.

## 2017-04-21 NOTE — PROGRESS NOTES
Pulmonary Critical Care Progress Note        Date of Service:  4/21/17    Chief Complaint: Collapsed and unresponsive    History of Present Illness: The patient is a 46-year-old female with a past medical history of tobacco abuse as well as depression who was brought in by EMS this evening after she was at her cousin's house and suddenly collapsed. The cousin reported that she was unresponsive and 911 was called.  When EMS arrived, they found that the patient was posturing.  The patient was given 2 mg of Narcan without change her blood sugars with acceptable at that point and was transported while she was actively being mask valve ventilation.  While in the emergency department due to her poor mental status and lack of protecting her airway, she was intubated by the emergency room staff. Laboratories returned not showing anything of significance.  The CT of the brain was obtained showing hemorrhage presented in the left thalamus with intraventricular extension.  Neurosurgery was notified and took her to the operating room in which they placed a ventricular drain.  She is currently mildly sedated on the ventilator at the time of the interview.      ROS:  Respiratory: unable to perform due to the patient's inability to effectively communicate Cardiac: unable to perform due to the patient's inability to effectively communicate   GI: unable to perform due to the patient's inability to effectively communicate.    All other systems negative.      Interval Events:  24 hour interval history reviewed   No overnight events.   Not following commands. No response to painful stimuli.   40 propofol   ICP 11-17  ST, BP ok   Tube feeds at 125  NS 75  CXR shows no changes.       Yesterday:  No overnight changes  Q1 neuro and ventric check  Posturing  Turned off propofol and BP increased, turned back on  SR, SB after surgery but resolved, BP okay  green secretion from OG  Afebrile  Prop 15  ERP protocol  Nicardipine and  norepinephrine to PRN. Remove OG and place Cortrak for tube feeding.      PFSH:  No change.    Respiratory:  Saleem Vent Mode: APVCMV, Rate (breaths/min): 18, Vt Target (mL): 400, PEEP/CPAP: 8, FiO2: 30, Static Compliance (ml / cm H2O): 55, Control VTE (exp VT): 401  Pulse Oximetry: 100 %PIP: 17  Exam: Clear to auscultation bilaterally.  ImagingAvailable data reviewed       Recent Labs      04/19/17   1727  04/20/17   0510  04/21/17   0512   ISTATAPH  7.308*  7.380*  7.379*   ISTATAPCO2  36.1  31.7  33.1   ISTATAPO2  337*  97*  109*   ISTATATCO2  19*  20  21   [id number]  100*  97  98   ISTATARTHCO3  18.1  18.7  19.6   ISTATARTBE  -8*  -6*  -5*   ISTATTEMP  92.0 F  97.2 F  98.2 F   ISTATFIO2  60  30  30   ISTATSPEC  Arterial  Arterial  Arterial   ISTATAPHTC  7.359*  7.391*  7.382*   [id number]  318*  92*  107*       HemoDynamics:  Pulse: (!) 127, Heart Rate (Monitored): (!) 126  Arterial BP: 126/90 mmHg, NIBP: 140/68 mmHg     Exam: Regular rate, regular rhythm  Imaging: Available data reviewed     Recent Labs      04/19/17   1336   TROPONINI  <0.01       Neuro:  GCS Total Paras Coma Score: 5  ICP: 22 MM HG  Exam: Appears to have a corneal reflex. Eyes are roving back and forth. Pupils are 3-4 mm and minimally reactive. Does not respond to any painful stimuli.   Imaging: Available data reviewed   Propofol 40    Fluids:  Intake/Output       04/19/17 0700 - 04/20/17 0659 04/20/17 0700 - 04/21/17 0659 04/21/17 0700 - 04/22/17 0659      4569-3169 2707-0533 Total 8039-3809 2870-7038 Total 4944-2858 8433-8373 Total       Intake    I.V.  1632.9  1652.3 3285.3  1179.2  894 2073.2  --  -- --    Crystalloid Intake 600 -- 600 -- -- -- -- -- --    Propofol Volume 32.9 152.3 185.3 179.2 144 323.2 -- -- --    IV Volume (Bolus) 1000 -- 1000 -- -- -- -- -- --    D5NS 20K -- 1500 1500 250 -- 250 -- -- --    IV Volume (< NS>) -- -- --  -- -- --    Other  --  -- --  --  90 90  --  -- --    Medications (P.O./ Enteral  Liquids) -- -- -- -- 90 90 -- -- --    Enteral  --  60 60  --  140 140  --  -- --    Enteral Volume -- -- -- -- 50 50 -- -- --    Free Water / Tube Flush -- 60 60 -- 90 90 -- -- --    Total Intake 1632.9 1712.3 3345.3 1179.2 1124 2303.2 -- -- --       Output    Urine  1950  950 2900  555  400 955  --  -- --    Indwelling Cathether  555 400 955 -- -- --    Void (ml) 1300 -- 1300 -- -- -- -- -- --    Drains  --  106 106  260.5  87 347.5  --  -- --    Right Ventriculostomy -- 106 106 110.5 87 197.5 -- -- --    Oral Gastric Tube -- -- -- 150 -- 150 -- -- --    Stool  --  -- --  --  -- --  --  -- --    Number of Times Stooled -- -- -- 0 x -- 0 x -- -- --    Blood  30  -- 30  --  -- --  --  -- --    Est. Blood Loss (mL) 30 -- 30 -- -- -- -- -- --    Total Output  1056 3036 815.5 487 1302.5 -- -- --       Net I/O     -347.1 656.3 309.3 363.7 637 1000.7 -- -- --        Weight: 59.7 kg (131 lb 9.8 oz)  Recent Labs      17   0430  17   1100  17   043   SODIUM  142  137  144   POTASSIUM  3.8  3.7  3.9   CHLORIDE  115*  112  116*   CO2  17*  20  18*   BUN  6*  5*  9   CREATININE  0.56  0.58  0.52   MAGNESIUM  1.6   --   2.1   PHOSPHORUS  2.6   --   2.4*   CALCIUM  8.0*  8.6  8.7       GI/Nutrition:  Exam: Bowel sounds present.  Imaging: Available data reviewed  tube feed Tolerated       Liver Function  Recent Labs      17   1336  17   0430  17   1100  17   0435   ALTSGPT  10  13   --    --    ASTSGOT  16  34   --    --    ALKPHOSPHAT  107*  83   --    --    TBILIRUBIN  0.2  0.3   --    --    GLUCOSE  235*  125*  124*  128*       Heme:  Recent Labs      17   1336  17   0430  17   RBC  4.53  4.27  4.30   HEMOGLOBIN  13.3  12.6  12.8   HEMATOCRIT  40.7  38.4  39.4   PLATELETCT  279  182  164   PROTHROMBTM  12.6   --    --    APTT  28.0   --    --    INR  0.92   --    --        Infectious Disease:  Monitored Temp  Av.5 °C (97.7 °F)  Min: 36 °C  (96.8 °F)  Max: 36.8 °C (98.2 °F)  Micro: reviewed  Recent Labs      04/19/17   1336  04/20/17   0430  04/21/17   0435   WBC  15.3*  11.3*  13.1*   NEUTSPOLYS  62.90  82.60*   --    LYMPHOCYTES  29.30  10.20*   --    MONOCYTES  5.40  6.30   --    EOSINOPHILS  1.10  0.20   --    BASOPHILS  0.60  0.30   --    ASTSGOT  16  34   --    ALTSGPT  10  13   --    ALKPHOSPHAT  107*  83   --    TBILIRUBIN  0.2  0.3   --      Current Facility-Administered Medications   Medication Dose Frequency Provider Last Rate Last Dose   • NS infusion   Continuous Reggie Strickland M.D. 75 mL/hr at 04/20/17 2345     • midazolam (VERSED) injection 1-5 mg  1-5 mg Q HOUR PRN Gabe Roblero M.D.       • propofol (DIPRIVAN) injection  5-80 mcg/kg/min Continuous Gabe Roblero M.D.   Stopped at 04/21/17 0455   • labetalol (NORMODYNE,TRANDATE) injection 10 mg  10 mg Q HOUR PRN Gabe Roblero M.D.   10 mg at 04/19/17 1435   • hydrALAZINE (APRESOLINE) injection 20 mg  20 mg Q4HRS PRN Gabe Roblero M.D.       • trazodone (DESYREL) tablet 200 mg  200 mg HS PRN Nate Xiao M.D.       • duloxetine (CYMBALTA) capsule 60 mg  60 mg BID Nate Xiao M.D.   60 mg at 04/20/17 2053   • senna-docusate (PERICOLACE or SENOKOT S) 8.6-50 MG per tablet 2 Tab  2 Tab BID Nate Xiao M.D.   2 Tab at 04/20/17 2052    And   • polyethylene glycol/lytes (MIRALAX) PACKET 1 Packet  1 Packet QDAY PRN Nate Xiao M.D.        And   • magnesium hydroxide (MILK OF MAGNESIA) suspension 30 mL  30 mL QDAY PRN Nate Xiao M.D.        And   • bisacodyl (DULCOLAX) suppository 10 mg  10 mg QDAY PRN Nate Xiao M.D.       • acetaminophen (TYLENOL) tablet 650 mg  650 mg Q6HRS PRN Nate Xiao M.D.       • Pharmacy Consult Request ...Pain Management Review   PRN Nate Xiao M.D.        And   • oxycodone immediate-release (ROXICODONE) tablet 2.5 mg  2.5 mg Q3HRS PRN Nate Xiao M.D.        And   • oxycodone immediate-release (ROXICODONE) tablet 5 mg  5 mg Q3HRS  PRN Nate Xiao M.D.        And   • morphine (pf) 4 mg/ml injection 2 mg  2 mg Q3HRS PRN Nate Xiao M.D.       • ondansetron (ZOFRAN) syringe/vial injection 4 mg  4 mg Q4HRS PRN Nate Xiao M.D.       • ondansetron (ZOFRAN ODT) dispertab 4 mg  4 mg Q4HRS PRN Nate Xiao M.D.       • promethazine (PHENERGAN) tablet 12.5-25 mg  12.5-25 mg Q4HRS PRN Nate Xiao M.D.       • promethazine (PHENERGAN) suppository 12.5-25 mg  12.5-25 mg Q4HRS PRN Nate Xiao M.D.       • prochlorperazine (COMPAZINE) injection 5-10 mg  5-10 mg Q4HRS PRN Nate Xiao M.D.       • MD ALERT...Do not administer NSAIDS or ASPIRIN unless ORDERED By Neurosurgery 1 Each  1 Each PRN Tyron Contreras M.D.       • artificial tear ointment (REFRESH,LACRI-LUBE) 1 Application  1 Application Q8HRS Tyron Contreras M.D.   1 Application at 04/20/17 7645   • bisacodyl (DULCOLAX) suppository 10 mg  10 mg QDAY PRN Tyron Contreras M.D.       • fleet enema 133 mL  1 Each QDAY PRN Tyron Contreras M.D.       • acetaminophen (TYLENOL) suppository 975 mg  975 mg Q6HRS Tyron Contreras M.D.   Stopped at 04/19/17 1800   • Respiratory Care per Protocol   Continuous RT Evelyn Neely M.D.       • lactulose 20 GM/30ML solution 30 mL  30 mL Q24HRS PRN Evelyn Neely M.D.       • bisacodyl (DULCOLAX) suppository 10 mg  10 mg Q24HRS PRN Evelyn Neely M.D.       • fleet enema 133 mL  1 Each Once PRN Evelyn Neely M.D.       • chlorhexidine (PERIDEX) 0.12 % solution 15 mL  15 mL BID Evelyn Neely M.D.   15 mL at 04/20/17 2053   • lidocaine (XYLOCAINE) 1%  injection  1-2 mL Q30 MIN PRN Evelyn Neely M.D.       • MD ALERT...Adult ICU Electrolyte Replacement per Pharmacy Protocol   pharmacy to dose Evelyn Neely M.D.       • fentaNYL (SUBLIMAZE) injection 25 mcg  25 mcg Q HOUR PRN Evelyn Neely M.D.        Or   • fentaNYL (SUBLIMAZE) injection 50 mcg  50 mcg Q HOUR PRN Evelyn Neely M.D.   50 mcg at 04/21/17 0053     Or   • fentaNYL (SUBLIMAZE) injection 100 mcg  100 mcg Q HOUR PRN Evelyn Neely M.D.   100 mcg at 04/21/17 0500   • famotidine (PEPCID) tablet 20 mg  20 mg Q12HRS Evelyn Neely M.D.   20 mg at 04/20/17 2052    Or   • famotidine (PEPCID) injection 20 mg  20 mg Q12HRS Evelyn Neely M.D.   20 mg at 04/20/17 0812   • midazolam (VERSED) premix 125 mg/125 mL NS  1 mg/hr Continuous Tyron Contreras M.D.   Stopped at 04/19/17 1830     Last reviewed on 4/19/2017  3:53 PM by Merari Pacheco [facility]    Quality  Measures:  Medications reviewed, EKG reviewed, Labs reviewed and Radiology images reviewed  Persaud catheter: Critically Ill - Requiring Accurate Measurement of Urinary Output      DVT Prophylaxis: Contraindicated - High bleeding risk  DVT prophylaxis - mechanical: SCDs  Ulcer prophylaxis: Yes  Antibiotics: Treating active infection/contamination beyond 24 hours perioperative coverage        Assessment/Plan:  Ventilator-dependent respiratory failure secondary to large intraventricular hemorrhage.   - Cont full ventilator support   - RT/O2 protocols   - No SBTs unless mental status improves  Acute large intraventricular hemorrhage likely related to methamphetamine abuse.   - s/p ventriculostomy   - propofol for posturing   - SBP goals > 100 and < 140 with prn hydralazine and labetalol  Acute hydrocephalus related to intraventricular hemorrhage status post ventriculostomy placement.   - improved CT head after drain  Acute leukocytosis.   - likely related to aspiration   - cont unasyn  Hypokalemia.   - replete as needed  Hypomagnesia   - replete as needed  History of depression and psychiatric disorder, using Abilify.  Methamphetamine positive on urine drug screen.  Tobacco abuse.      Discussed patient condition and risk of morbidity and/or mortality with Family, RN, RT and Pharmacy.    The patient remains critically ill.  Critical care time = 33 minutes in directly providing and coordinating critical care  and extensive data review.  No time overlap and excludes procedures.      IAmalia (Scribbryan), am scribing for, and in the presence of, Dr. Chin M.D..  Electronically signed by: Amalia Herrera (Daryn), 4/21/2017  IDr. Chin M.D. personally performed the services described in this documentation, as scribed by Amalia Herrera in my presence, and it is both accurate and complete.

## 2017-04-22 NOTE — PALLIATIVE CARE
Palliative Care follow-up  Call received from FUNMI Pedro RN, stating that sister was at bedside and requesting comfort measures and was awaiting discussion with [facility]. PC RN arrived to bedside to provide support to Kathryn and her family. She was present with her ex- and daughter from [place] and her son and his fiance from [place]. PC RN provided a general explanation of the donation process and comfort measures, more specifically when then would say their good-byes. Kathryn and her family have some very strong feelings about members of the family/friends that may be visiting. PC RN reminded Kathryn that she has control of who can visit at this time. PC RN reminded the family that this time is about Marly and the focus should be on positive support at this time; they agreed. They denied having any other needs or questions at this time. Kathryn expressed needing to leave to go home in the next day. PC RN offered spiritual visit but they declined.    Updated: Dr. Ferny Strickland/FUNMI RN    Plan: Comfort care; possible Donor Services once reviewed    Thank you for allowing Palliative Care to participate in this patient's care. Please feel free to call [phone] with any questions or concerns.

## 2017-04-22 NOTE — CARE PLAN
Problem: Ventilation Defect:  Goal: Ability to achieve and maintain unassisted ventilation or tolerate decreased levels of ventilator support  Intervention: Support and monitor invasive and noninvasive mechanical ventilation  Adult Ventilation Update    Total Vent Days: 4      Patient Lines/Drains/Airways Status    Active Airway      Name: Placement date: Placement time: Site: Days:     Airway Group ET Tube Oral 7.5 04/19/17  1345  Oral  4               Plateau Pressure (Q Shift): 17 (04/22/17 0249)  Static Compliance (ml / cm H2O): 42 (04/22/17 1645)      Sputum/Suction   Cough: Non Productive (04/22/17 1600)  Sputum Amount: Scant (04/22/17 1600)  Sputum Color: Clear (04/22/17 1600)  Sputum Consistency: Thin (04/22/17 1600)    Mobility Group  Activity Performed: Unable to mobilize (04/22/17 1200)  Reason Not Mobilized: Unstable condition (04/22/17 1200)    Events/Summary/Plan: placed on spont for a few minutes per Dr. Neely for Donor network.  RR 24 vt's 300 (04/22/17 1645)

## 2017-04-22 NOTE — PROGRESS NOTES
Dr. Aponte updated regarding change in neuro assessment, no cough gag or corneal reflexes at this time, minimal posturing. No orders received.

## 2017-04-22 NOTE — PROGRESS NOTES
Pulmonary Critical Care Progress Note        Date of Service:  4/22/17    Chief Complaint: Collapsed and unresponsive    History of Present Illness: The patient is a 46-year-old female with a past medical history of tobacco abuse as well as depression who was brought in by EMS this evening after she was at her cousin's house and suddenly collapsed. The cousin reported that she was unresponsive and 911 was called.  When EMS arrived, they found that the patient was posturing.  The patient was given 2 mg of Narcan without change her blood sugars with acceptable at that point and was transported while she was actively being mask valve ventilation.  While in the emergency department due to her poor mental status and lack of protecting her airway, she was intubated by the emergency room staff. Laboratories returned not showing anything of significance.  The CT of the brain was obtained showing hemorrhage presented in the left thalamus with intraventricular extension.  Neurosurgery was notified and took her to the operating room in which they placed a ventricular drain.  She is currently mildly sedated on the ventilator at the time of the interview.      ROS:  Respiratory: unable to perform due to the patient's inability to effectively communicate Cardiac: unable to perform due to the patient's inability to effectively communicate   GI: unable to perform due to the patient's inability to effectively communicate.    All other systems negative.      Interval Events:  24 hour interval history reviewed   Eyes continue to rove back and forth. Not responding.   Neuro aware of the patient   Propofol 40  SR, -140  97-99 T max  CXR: clear lung fields bilaterally.   Pt. Made DNR.     Yesterday:  No overnight events.   Not following commands. No response to painful stimuli.   40 propofol   ICP 11-17  ST, BP ok   Tube feeds at 125  NS 75  CXR shows no changes.     PFSH:  No change.    Respiratory:  Saleem Vent Mode: APVCMV,  Rate (breaths/min): 18, Vt Target (mL): 400, PEEP/CPAP: 8, FiO2: 30, Static Compliance (ml / cm H2O): 50, Control VTE (exp VT): 399  Pulse Oximetry: 100 %PIP: 17  Exam: Clear to auscultation bilaterally without wheezing  ImagingAvailable data reviewed       Recent Labs      04/20/17   0510  04/21/17   0512  04/22/17   0249   ISTATAPH  7.380*  7.379*  7.472   ISTATAPCO2  31.7  33.1  30.4   ISTATAPO2  97*  109*  95*   ISTATATCO2  20  21  23   [id number]  97  98  98   ISTATARTHCO3  18.7  19.6  22.2   ISTATARTBE  -6*  -5*  -1   ISTATTEMP  97.2 F  98.2 F  97.9 F   ISTATFIO2  30  30  30   ISTATSPEC  Arterial  Arterial  Arterial   ISTATAPHTC  7.391*  7.382*  7.477   [id number]  92*  107*  92*       HemoDynamics:  Pulse: 75, Heart Rate (Monitored): 74  Arterial BP: (!) 98/85 mmHg, NIBP: 137/74 mmHg     Exam: Regular rate, regular rhythm  Imaging: Available data reviewed     Recent Labs      04/19/17   1336   TROPONINI  <0.01       Neuro:  GCS Total [place] Coma Score: 3  ICP: 16 MM HG  Exam: Appears to have a corneal reflex. Eyes are roving back and forth. Pupils are 3-4 mm and minimally reactive. Does not respond to any painful stimuli.   Imaging: Available data reviewed   Propofol 40    Fluids:  Intake/Output       04/20/17 0700 - 04/21/17 0659 04/21/17 0700 - 04/22/17 0659 04/22/17 0700 - 04/23/17 0659      0528-6422 7598-9133 Total 8135-0210 1606-8376 Total 6649-5172 5446-5088 Total       Intake    I.V.  1179.2  1050.7 2229.9  1581  899.6 2480.6  --  -- --    Propofol Volume 179.2 150.7 329.9 181 149.6 330.6 -- -- --    D5NS 20K 250 -- 250 -- -- -- -- -- --    IV Volume (< NS>)   -- -- --    IV Piggyback Volume (IV Piggyback) -- -- -- 500 -- 500 -- -- --    Other  --  90 90  90  60 150  --  -- --    Medications (P.O./ Enteral Liquids) -- 90 90 90 60 150 -- -- --    Enteral  --  190 190  420  580 1000  --  -- --    Enteral Volume -- 100 100 300 490 790 -- -- --    Free Water / Tube Flush -- 90  90 120 90 210 -- -- --    Total Intake 1179.2 1330.7 2509.9 2091 1539.6 3630.6 -- -- --       Output    Urine  555  600 1155  625  525 1150  --  -- --    Indwelling Cathether   -- -- --    Drains  260.5  106 366.5  83.6  69 152.6  --  -- --    Right Ventriculostomy 110.5 106 216.5 83.6 69 152.6 -- -- --    Oral Gastric Tube 150 -- 150 -- -- -- -- -- --    Stool  --  -- --  --  -- --  --  -- --    Number of Times Stooled 0 x -- 0 x 1 x -- 1 x -- -- --    Total Output 815.5 706 1521.5 708.6 594 1302.6 -- -- --       Net I/O     363.7 624.7 988.4 1382.4 945.6 2328 -- -- --           Recent Labs      17   0430  17   0425   SODIUM  142  137  144  142   POTASSIUM  3.8  3.7  3.9  3.6   CHLORIDE  115*  112  116*  114*   CO2  17*  20  18*  22   BUN  6*  5*  9  11   CREATININE  0.56  0.58  0.52  0.45*   MAGNESIUM  1.6   --   2.1  1.9   PHOSPHORUS  2.6   --   2.4*  2.1*   CALCIUM  8.0*  8.6  8.7  8.7       GI/Nutrition:  Exam: Bowel sounds present.  Imaging: Available data reviewed  tube feed Tolerated       Liver Function  Recent Labs      17   1336  17   0430  17   1100  17   0435  17   0425   ALTSGPT  10  13   --    --    --    ASTSGOT  16  34   --    --    --    ALKPHOSPHAT  107*  83   --    --    --    TBILIRUBIN  0.2  0.3   --    --    --    GLUCOSE  235*  125*  124*  128*  120*       Heme:  Recent Labs      17   1336  17   0430  17   04317   0425   RBC  4.53  4.27  4.30  3.96*   HEMOGLOBIN  13.3  12.6  12.8  11.8*   HEMATOCRIT  40.7  38.4  39.4  35.9*   PLATELETCT  279  182  164  161*   PROTHROMBTM  12.6   --    --    --    APTT  28.0   --    --    --    INR  0.92   --    --    --        Infectious Disease:  Monitored Temp  Av.6 °C (97.9 °F)  Min: 36 °C (96.8 °F)  Max: 37.4 °C (99.3 °F)  Micro: reviewed  Recent Labs      17   1336  17   0430  17   0435  17   0425    WBC  15.3*  11.3*  13.1*  11.7*   NEUTSPOLYS  62.90  82.60*   --    --    LYMPHOCYTES  29.30  10.20*   --    --    MONOCYTES  5.40  6.30   --    --    EOSINOPHILS  1.10  0.20   --    --    BASOPHILS  0.60  0.30   --    --    ASTSGOT  16  34   --    --    ALTSGPT  10  13   --    --    ALKPHOSPHAT  107*  83   --    --    TBILIRUBIN  0.2  0.3   --    --      Current Facility-Administered Medications   Medication Dose Frequency Provider Last Rate Last Dose   • NS infusion   Continuous Reggie CHASE Strickland M.D. 75 mL/hr at 04/20/17 2345     • midazolam (VERSED) injection 1-5 mg  1-5 mg Q HOUR PRN Gabe Roblero M.D.       • propofol (DIPRIVAN) injection  5-80 mcg/kg/min Continuous Gabe Roblero M.D. 15.8 mL/hr at 04/22/17 0038 40 mcg/kg/min at 04/22/17 0038   • labetalol (NORMODYNE,TRANDATE) injection 10 mg  10 mg Q HOUR PRN Gabe Roblero M.D.   10 mg at 04/19/17 1435   • hydrALAZINE (APRESOLINE) injection 20 mg  20 mg Q4HRS PRN Gabe Roblero M.D.       • trazodone (DESYREL) tablet 200 mg  200 mg HS PRN Nate Xiao M.D.       • senna-docusate (PERICOLACE or SENOKOT S) 8.6-50 MG per tablet 2 Tab  2 Tab BID Nate Xiao M.D.   2 Tab at 04/21/17 2157    And   • polyethylene glycol/lytes (MIRALAX) PACKET 1 Packet  1 Packet QDAY PRN Nate Xiao M.D.        And   • magnesium hydroxide (MILK OF MAGNESIA) suspension 30 mL  30 mL QDAY PRN Nate Xiao M.D.        And   • bisacodyl (DULCOLAX) suppository 10 mg  10 mg QDAY PRN Nate Xiao M.D.       • acetaminophen (TYLENOL) tablet 650 mg  650 mg Q6HRS PRN Nate S Dameon, M.D.       • Pharmacy Consult Request ...Pain Management Review   PRN Nate Xiao M.D.        And   • oxycodone immediate-release (ROXICODONE) tablet 2.5 mg  2.5 mg Q3HRS PRN Nate Xiao M.D.        And   • oxycodone immediate-release (ROXICODONE) tablet 5 mg  5 mg Q3HRS PRN Nate Xiao M.D.        And   • morphine (pf) 4 mg/ml injection 2 mg  2 mg Q3HRS PRN Nate Xiao M.D.       •  ondansetron (ZOFRAN) syringe/vial injection 4 mg  4 mg Q4HRS PRN Nate Xiao M.D.       • ondansetron (ZOFRAN ODT) dispertab 4 mg  4 mg Q4HRS PRN Nate Xiao M.D.       • promethazine (PHENERGAN) tablet 12.5-25 mg  12.5-25 mg Q4HRS PRN Nate Xiao M.D.       • promethazine (PHENERGAN) suppository 12.5-25 mg  12.5-25 mg Q4HRS PRN Nate Xiao M.D.       • prochlorperazine (COMPAZINE) injection 5-10 mg  5-10 mg Q4HRS PRN Nate Xiao M.D.       • MD ALERT...Do not administer NSAIDS or ASPIRIN unless ORDERED By Neurosurgery 1 Each  1 Each PRN Tyron Contreras M.D.       • artificial tear ointment (REFRESH,LACRI-LUBE) 1 Application  1 Application Q8HRS Tyron Contreras M.D.   1 Application at 04/21/17 2158   • bisacodyl (DULCOLAX) suppository 10 mg  10 mg QDAY PRN Tyron Contreras M.D.       • fleet enema 133 mL  1 Each QDAY PRN Tyron Contreras M.D.       • acetaminophen (TYLENOL) suppository 975 mg  975 mg Q6HRS Tyron Contreras M.D.   Stopped at 04/19/17 1800   • Respiratory Care per Protocol   Continuous RT Evelyn Neely M.D.       • lactulose 20 GM/30ML solution 30 mL  30 mL Q24HRS PRN Evelyn Neely M.D.       • bisacodyl (DULCOLAX) suppository 10 mg  10 mg Q24HRS PRN Evelyn Neely M.D.       • fleet enema 133 mL  1 Each Once PRN Evelyn Neely M.D.       • chlorhexidine (PERIDEX) 0.12 % solution 15 mL  15 mL BID Evelyn Neely M.D.   15 mL at 04/21/17 2157   • lidocaine (XYLOCAINE) 1%  injection  1-2 mL Q30 MIN PRN Evelyn Neely M.D.       • MD ALERT...Adult ICU Electrolyte Replacement per Pharmacy Protocol   pharmacy to dose Evelyn Neely M.D.       • fentaNYL (SUBLIMAZE) injection 25 mcg  25 mcg Q HOUR PRN Evelyn Neely M.D.        Or   • fentaNYL (SUBLIMAZE) injection 50 mcg  50 mcg Q HOUR PRN Evelyn Neely M.D.   50 mcg at 04/21/17 0050    Or   • fentaNYL (SUBLIMAZE) injection 100 mcg  100 mcg Q HOUR PRN Evelyn Neely M.D.   100 mcg at 04/21/17 2495    • famotidine (PEPCID) tablet 20 mg  20 mg Q12HRS Evelyn Neely M.D.   20 mg at 04/21/17 2158    Or   • famotidine (PEPCID) injection 20 mg  20 mg Q12HRS Evelyn Neely M.D.   20 mg at 04/20/17 0812   • midazolam (VERSED) premix 125 mg/125 mL NS  1 mg/hr Continuous Tyron Contreras M.D.   Stopped at 04/19/17 1830     Last reviewed on 4/19/2017  3:53 PM by Merari Pacheco, [facility]    Quality  Measures:  Medications reviewed, EKG reviewed, Labs reviewed and Radiology images reviewed  Persaud catheter: Critically Ill - Requiring Accurate Measurement of Urinary Output      DVT Prophylaxis: Contraindicated - High bleeding risk  DVT prophylaxis - mechanical: SCDs  Ulcer prophylaxis: Yes  Antibiotics: Treating active infection/contamination beyond 24 hours perioperative coverage        Assessment/Plan:  Ventilator-dependent respiratory failure secondary to large intraventricular hemorrhage.   - Cont full ventilator support   - RT/O2 protocols   - No SBTs unless mental status improves  Acute large intraventricular hemorrhage likely related to methamphetamine abuse.   - s/p ventriculostomy   - propofol for posturing   - SBP goals > 100 and < 140 with prn hydralazine and labetalol  Acute hydrocephalus related to intraventricular hemorrhage status post ventriculostomy placement.   - improved CT head after drain  Acute leukocytosis.   - likely related to aspiration   - cont unasyn  Hypokalemia.   - replete as needed  Hypomagnesia   - replete as needed  History of depression and psychiatric disorder, using Abilify.  Methamphetamine positive on urine drug screen.  Tobacco abuse.  DNR       Discussed patient condition and risk of morbidity and/or mortality with Family, RN, RT and Pharmacy.    The patient remains critically ill.  Critical care time = 31 minutes in directly providing and coordinating critical care and extensive data review.  No time overlap and excludes procedures.      Amalia MADRIGAL (Scribe), jared scribing  for, and in the presence of, Dr. Chin M.D..  Electronically signed by: Amalia Herrera (Scribe), 4/22/2017  I, Dr. Chin M.D. personally performed the services described in this documentation, as scribed by Amalia Herrera in my presence, and it is both accurate and complete.

## 2017-04-22 NOTE — CARE PLAN
Problem: Infection  Goal: Will remain free from infection  CHG and soap/water bathing provided for patient. Assessing need for all LDAs.     Problem: Venous Thromboembolism (VTW)/Deep Vein Thrombosis (DVT) Prevention:  Goal: Patient will participate in Venous Thrombosis (VTE)/Deep Vein Thrombosis (DVT)Prevention Measures  SCDs in place throughout shift, no s/s DVT.

## 2017-04-22 NOTE — PROGRESS NOTES
Progress Note               Author: Kita Love Date & Time created: 4/22/2017  8:58 AM     Interval History:  Continues poorly  [id number]  EVD working  Marrive IVH and thalamic ICH  Roving eyes  Dr. Contreras has spoken to family and expressed poor prognosis  Labs ok  ICP teens  DNR discssed      Review of Systems:  ROS    Physical Exam:  Physical Exam    Labs:  Recent Labs      04/20/17   0510  04/21/17   0512  04/22/17   0249   ISTATAPH  7.380*  7.379*  7.472   ISTATAPCO2  31.7  33.1  30.4   ISTATAPO2  97*  109*  95*   ISTATATCO2  20  21  23   [id number]  97  98  98   ISTATARTHCO3  18.7  19.6  22.2   ISTATARTBE  -6*  -5*  -1   ISTATTEMP  97.2 F  98.2 F  97.9 F   ISTATFIO2  30  30  30   ISTATSPEC  Arterial  Arterial  Arterial   ISTATAPHTC  7.391*  7.382*  7.477   [id number]  92*  107*  92*     Recent Labs      04/19/17   1336   TROPONINI  <0.01     Recent Labs      04/20/17   0430  04/20/17   1100  04/21/17   0435  04/22/17   0425   SODIUM  142  137  144  142   POTASSIUM  3.8  3.7  3.9  3.6   CHLORIDE  115*  112  116*  114*   CO2  17*  20  18*  22   BUN  6*  5*  9  11   CREATININE  0.56  0.58  0.52  0.45*   MAGNESIUM  1.6   --   2.1  1.9   PHOSPHORUS  2.6   --   2.4*  2.1*   CALCIUM  8.0*  8.6  8.7  8.7     Recent Labs      04/19/17   1336  04/20/17   0430  04/20/17   1100  04/21/17   0435  04/22/17   0425   ALTSGPT  10  13   --    --    --    ASTSGOT  16  34   --    --    --    ALKPHOSPHAT  107*  83   --    --    --    TBILIRUBIN  0.2  0.3   --    --    --    GLUCOSE  235*  125*  124*  128*  120*     Recent Labs      04/19/17   1336  04/20/17   0430  04/21/17   0435  04/22/17   0425   RBC  4.53  4.27  4.30  3.96*   HEMOGLOBIN  13.3  12.6  12.8  11.8*   HEMATOCRIT  40.7  38.4  39.4  35.9*   PLATELETCT  279  182  164  161*   PROTHROMBTM  12.6   --    --    --    APTT  28.0   --    --    --    INR  0.92   --    --    --      Recent Labs      04/19/17   1336  04/20/17   0430  04/21/17 0435 04/22/17   0425    WBC  15.3*  11.3*  13.1*  11.7*   NEUTSPOLYS  62.90  82.60*   --    --    LYMPHOCYTES  29.30  10.20*   --    --    MONOCYTES  5.40  6.30   --    --    EOSINOPHILS  1.10  0.20   --    --    BASOPHILS  0.60  0.30   --    --    ASTSGOT  16  34   --    --    ALTSGPT  10  13   --    --    ALKPHOSPHAT  107*  83   --    --    TBILIRUBIN  0.2  0.3   --    --      Hemodynamics:  No data recorded.  Monitored Temp: 37.7 °C (99.9 °F)  Pulse  Av.7  Min: 36  Max: 127Heart Rate (Monitored): 77  Arterial BP: 131/86 mmHg, NIBP: 131/71 mmHg     Respiratory:  Saleem Vent Mode: APVCMV, Rate (breaths/min): 18, PEEP/CPAP: 8, FiO2: 30, P Peak (PIP): 26, P MEAN: 14 Respiration: 18, Pulse Oximetry: 98 %     Work Of Breathing / Effort: Vented  RUL Breath Sounds: Clear, RML Breath Sounds: Clear, RLL Breath Sounds: Diminished, AKIL Breath Sounds: Clear, LLL Breath Sounds: Diminished  Fluids:    Intake/Output Summary (Last 24 hours) at 17 0858  Last data filed at 17 0800   Gross per 24 hour   Intake 2757.57 ml   Output   1731 ml   Net 1026.57 ml     Weight: 60.1 kg (132 lb 7.9 oz)  GI/Nutrition:  Orders Placed This Encounter   Procedures   • Diet NPO     Standing Status: Standing      Number of Occurrences: 1      Standing Expiration Date:      Order Specific Question:  Type:     Answer:  Now [1]     Order Specific Question:  Restrict to:     Answer:  Strict [1]     Medical Decision Making, by Problem:  Active Hospital Problems    Diagnosis   • *Intraventricular hemorrhage (CMS-HCC) [I61.5]   • Methamphetamine dependence (CMS-HCC) [F15.20]   • Acute respiratory failure with hypoxia (CMS-HCC) [J96.01]   • Hyperglycemia [R73.9]   • Hypokalemia [E87.6]       Plan:  No changes   Poor prognosis  I would support comfort care when family agreeable.     Core Measures

## 2017-04-22 NOTE — PROGRESS NOTES
Hospital Medicine Progress Note, Adult, Complex               Author: Reggie Isaacs Marco A Date & Time created: 4/22/2017  4:19 PM     Interval History:  Pt admitted with ICH, positive urine screen for meth    ICP teens, increased UO, code status changed to DNR per family    Review of Systems:  Review of Systems   Unable to perform ROS: medical condition       Physical Exam:  Physical Exam   Constitutional: She appears well-developed.   HENT:   Head: Normocephalic.   Right Ear: External ear normal.   Left Ear: External ear normal.   Mouth/Throat: No oropharyngeal exudate.   EVD in place   Eyes: Right eye exhibits no discharge. Left eye exhibits no discharge. No scleral icterus.   Pupils non reactive  Roving eyes     Neck: Neck supple. No JVD present. No tracheal deviation present.   Cardiovascular: Normal rate and regular rhythm.  Exam reveals no gallop and no friction rub.    No murmur heard.  Pulmonary/Chest: Effort normal. No stridor. No respiratory distress. She has no decreased breath sounds. She has rales. She exhibits no tenderness and no crepitus.   Abdominal: Soft. Bowel sounds are normal. She exhibits no distension. There is no tenderness. There is no rebound.   Musculoskeletal: She exhibits no edema or tenderness.   Neurological:   Exam limited by sedation, Pupils fixed   Skin: Skin is warm and dry. She is not diaphoretic. No cyanosis or erythema. Nails show no clubbing.   Psychiatric: Cognition and memory are impaired. She is noncommunicative.   Nursing note and vitals reviewed.      Labs:  Recent Labs      04/20/17   0510  04/21/17   0512  04/22/17   0249   ISTATAPH  7.380*  7.379*  7.472   ISTATAPCO2  31.7  33.1  30.4   ISTATAPO2  97*  109*  95*   ISTATATCO2  20  21  23   [id number]  97  98  98   ISTATARTHCO3  18.7  19.6  22.2   ISTATARTBE  -6*  -5*  -1   ISTATTEMP  97.2 F  98.2 F  97.9 F   ISTATFIO2  30  30  30   ISTATSPEC  Arterial  Arterial  Arterial   ISTATAPHTC  7.391*  7.382*  7.477   [id number]   92*  107*  92*         Recent Labs      17   1030   SODIUM  142   < >  144  142  148*   POTASSIUM  3.8   < >  3.9  3.6  3.7   CHLORIDE  115*   < >  116*  114*  117*   CO2  17*   < >  18*  22  24   BUN  6*   < >  9  11  12   CREATININE  0.56   < >  0.52  0.45*  0.49*   MAGNESIUM  1.6   --   2.1  1.9   --    PHOSPHORUS  2.6   --   2.4*  2.1*   --    CALCIUM  8.0*   < >  8.7  8.7  8.5    < > = values in this interval not displayed.     Recent Labs      17   1030   ALTSGPT  13   --    --    --    --    ASTSGOT  34   --    --    --    --    ALKPHOSPHAT  83   --    --    --    --    TBILIRUBIN  0.3   --    --    --    --    GLUCOSE  125*   < >  128*  120*  130*    < > = values in this interval not displayed.     Recent Labs      17   RBC  4.27  4.30  3.96*   HEMOGLOBIN  12.6  12.8  11.8*   HEMATOCRIT  38.4  39.4  35.9*   PLATELETCT  182  164  161*     Recent Labs      17   WBC  11.3*  13.1*  11.7*   NEUTSPOLYS  82.60*   --    --    LYMPHOCYTES  10.20*   --    --    MONOCYTES  6.30   --    --    EOSINOPHILS  0.20   --    --    BASOPHILS  0.30   --    --    ASTSGOT  34   --    --    ALTSGPT  13   --    --    ALKPHOSPHAT  83   --    --    TBILIRUBIN  0.3   --    --            Hemodynamics:  No data recorded.  Monitored Temp: 37.5 °C (99.5 °F)  Pulse  Av.5  Min: 36  Max: 127Heart Rate (Monitored): 89  Arterial BP: (!) 174/135 mmHg, NIBP: 141/71 mmHg     Respiratory:  Saleem Vent Mode: APVCMV, Rate (breaths/min): 18, PEEP/CPAP: 8, FiO2: 20, P Peak (PIP): 20, P MEAN: 11 Respiration: 18, Pulse Oximetry: 99 %     Work Of Breathing / Effort: Vented  RUL Breath Sounds: Clear, RML Breath Sounds: Clear, RLL Breath Sounds: Diminished, AKIL Breath Sounds: Clear, LLL Breath Sounds: Diminished  Fluids:    Intake/Output  Summary (Last 24 hours) at 04/22/17 1619  Last data filed at 04/22/17 1600   Gross per 24 hour   Intake 3513.65 ml   Output   2936 ml   Net 577.65 ml     Weight: 60.1 kg (132 lb 7.9 oz)  GI/Nutrition:  Orders Placed This Encounter   Procedures   • Diet NPO     Standing Status: Standing      Number of Occurrences: 1      Standing Expiration Date:      Order Specific Question:  Type:     Answer:  Now [1]     Order Specific Question:  Restrict to:     Answer:  Strict [1]     Medical Decision Making, by Problem:  Active Hospital Problems    Diagnosis   • *Intraventricular hemorrhage (CMS-HCC) [I61.5]  -s/p EVD  -overall prognosis for meaningful recovery is extremely guarded  -NSGY following   -family planning transitioning to comfort care, donor network following   • Methamphetamine dependence (CMS-HCC) [F15.20]   • Acute respiratory failure with hypoxia (CMS-HCC) [J96.01]  -vent management per dr Neely     • Hyperglycemia [R73.9]  -monitor cbg's   • Hypokalemia [E87.6]  Hypophosphatemia  Hypomagnesemia  -replete and monitor        * Increased urine output  -check urine osml and sodium monitor for central DI       Labs reviewed, Medications reviewed and Radiology images reviewed  Persaud catheter: Unconscious / Sedated Patient on a Ventilator      DVT Prophylaxis: Contraindicated - High bleeding risk  DVT prophylaxis - mechanical: SCDs  Ulcer prophylaxis: Yes

## 2017-04-22 NOTE — CARE PLAN
Problem: Ventilation Defect:  Goal: Ability to achieve and maintain unassisted ventilation or tolerate decreased levels of ventilator support  Intervention: Support and monitor invasive and noninvasive mechanical ventilation  Adult Ventilation Update    Total Vent Days: 4      Patient Lines/Drains/Airways Status    Active Airway      Name: Placement date: Placement time: Site: Days:     Airway Group ET Tube Oral 7.5 04/19/17  1345  Oral  4                 Plateau Pressure (Q Shift): 17 (04/22/17 0249)  Static Compliance (ml / cm H2O): 50 (04/22/17 0249)    Sputum/Suction yes  Cough: Non Productive (04/22/17 0249)  Sputum Amount: Unable to Evaluate (04/22/17 0249)  Sputum Color: Unable to Evaluate (04/22/17 0249)  Sputum Consistency: Unable to Evaluate (04/22/17 0249)    Events/Summary/Plan: no vent changes made during this shift

## 2017-04-22 NOTE — PALLIATIVE CARE
Palliative Care follow-up  Stopped by patient's room again and no family present. BS RN stated her SO came and went. Plan to call sister Kathryn to determine if she'll be present for Monday meeting with MD.    Plan: Will continue to attempt to reach family to provide support.     Thank you for allowing Palliative Care to participate in this patient's care. Please feel free to call [phone] with any questions or concerns.

## 2017-04-22 NOTE — PALLIATIVE CARE
Palliative Care follow-up  Rounded on patient; no family present. BS RN stated family comes around 1130 and stays for an hour or so.    Plan: Return after 1130 to talk with family/SO to provide support    Thank you for allowing Palliative Care to participate in this patient's care. Please feel free to call [phone] with any questions or concerns.

## 2017-04-22 NOTE — DISCHARGE PLANNING
Medical Social Work    KAYLEIGH was informed that SS had been paged and requested to meet with pt's sister who is at bedside.  SW went to pt's bedside and spoke with bedside RN.  Pt's sister has left the hospital and it is unknown when she will return.     Plan:  SS will remain available to provide support as needed.

## 2017-04-22 NOTE — PROGRESS NOTES
Patient's sister Kathryn (POA) called to get update on patient and provide RN with her phone number. Sister wishes to change code status to DNR and to continue plan of care at this time. Dr. Gonda updated to family's wishes.

## 2017-04-23 NOTE — PROGRESS NOTES
Notified by RN that family is ready for comfort care, met with pt's sister and family,discussed pt condition and process of initiating comfort care measures they are all in agreement to proceed with CC measures

## 2017-04-23 NOTE — DISCHARGE SUMMARY
DEATH SUMMARY    DATE OF ADMISSION:  2017.    DATE OF DEATH:  2017.    TIME OF DEATH:  2221 hours.    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    OTHER COMORBIDITIES:  Acute respiratory failure with hypoxia, hyperglycemia,   hypokalemia, hypomagnesemia.    PRESENTATION:  Please refer to the dictated H and P.    CONSULTANTS:  1.  Dr. Love and Dr. Contreras from neurosurgery.  2.  Dr. Neely from critical care.    HOSPITAL COURSE:  The patient is a 46-year-old female who was transferred to   the emergency room with altered mental status after she suddenly collapsed.    She was noted to have posturing.  She was given Narcan by the paramedics   without any change.  She was intubated.  Her CT of the head revealed   hemorrhage centered in the left thalamus with intraventricular extension.  She   was evaluated by neurosurgery and had right ventriculostomy placement for CSF   diversion.  Her neurologic status did not improve following CSF diversion and   was suggestive of severe encephalopathy.  After discussion with her sister   and her family, they all elected to transition her to comfort care.  She was   extubated and comfort care measures initiated and she .       ____________________________________     MD HIRAM LUCAS / MAMIE    DD:  2017 07:15:59  DT:  2017 07:51:32    D#:  [id number]  Job#:  [id number]

## 2017-04-23 NOTE — DISCHARGE PLANNING
Received page about not being able to contact family. Attempted to call back RN line continues to be busy. Please call SW [phone] if further assistance is needed.

## 2017-04-23 NOTE — CARE PLAN
Problem: Communication  Goal: The ability to communicate needs accurately and effectively will improve  Keeping family updated to plan of care, procedures, treatments.     Problem: Pain Management  Goal: Pain level will decrease to patient’s comfort goal  Pain management per MD order for comfort care protocol. Assessing pain at regular intervals.

## 2017-04-23 NOTE — PROGRESS NOTES
Patient  at  with friend Faiza at bedside. Calls placed to BEAU Peralta, and to denilsonsin Lila. Brief voicemails left with each.

## 2017-04-23 NOTE — CONSULTS
Onsite Evaluation    Referral # [id number]    Date: 4/22/17 Time: 1700    Thank you for the referral of this patient. A chart review has been completed to determine suitability for organ and tissue donation.    A donation discussion has occurred and the legal decision maker had DECLINED the option of organ donation.    Please call [facility] with cardiac time of death, so that the patient can be evaluated for tissue donation.     Micki Felton RN, BSN  Clinical  II  [facility] [place]    CALL [phone]) with any immediate needs.

## 2017-07-30 NOTE — DISCHARGE PLANNING
Received call back from pt's sister/NOK Kathryn Benitez [phone].  Kathryn just learned about pt's hospitalization from pt's SO and would like to be involved in pt's care.  Discussed ZEKE, Kathryn does not know how to get a hold of pt's son.  She lives in [place] and is going to try to drive to [employer] this evening in order to be at bedside by tomorrow.  Provided Kathryn with ICU RN station contact information, [facility] lodging information and unit SW contact information.     No pertinent family history in first degree relatives

## 2019-09-30 NOTE — DISCHARGE PLANNING
Called patient given normal test results   KAYLEIGH ran [employer] search to attempt to locate NOK.  Pt's son does not show up on search, other family members listed are Kathryn Benitez (no phone number listed).  KAYLEIGH attempted phone contact with pt's sister at number provided by [facility] ([phone]), left  requesting call back.

## (undated) DEVICE — DRESSING TRANSPARENT FILM TEGADERM 4 X 4.75" (50EA/BX)"

## (undated) DEVICE — SUTURE 4-0 ETHILON FS-2 18 (36PK/BX)"

## (undated) DEVICE — TRANSDUCER ADULT DISP. SINGLE BONDED STERILE - (20EA/CA)

## (undated) DEVICE — SURGIFOAM (SIZE 100) - (6EA/CA)

## (undated) DEVICE — ELECTRODE 850 FOAM ADHESIVE - HYDROGEL RADIOTRNSPRNT (50/PK)

## (undated) DEVICE — GLOVE BIOGEL ECLIPSE PF LATEX SIZE 9.0

## (undated) DEVICE — SURGIFOAM (12X7) - (12EA/CA)

## (undated) DEVICE — KIT SURGIFLO W/OUT THROMBIN - (6EA/CA)

## (undated) DEVICE — TOWELS CLOTH SURGICAL - (4/PK 20PK/CA)

## (undated) DEVICE — KIT EVACUATER 3 SPRING PVC LF 1/8 DRAIN SIZE (10EA/CA)"

## (undated) DEVICE — TUBING C&T SET FLYING LEADS DRAIN TUBING (10EA/BX)

## (undated) DEVICE — BOVIE BLADE COATED - (50/PK)

## (undated) DEVICE — KIT ANESTHESIA W/CIRCUIT & 3/LT BAG W/FILTER (20EA/CA)

## (undated) DEVICE — GLOVE BIOGEL ECLIPSE  PF LATEX SIZE 6.5 (50PR/BX)

## (undated) DEVICE — Device

## (undated) DEVICE — SET EXTENSION WITH 2 PORTS (48EA/CA) ***PART #2C8610 IS A SUBSTITUTE*****

## (undated) DEVICE — GLOVE BIOGEL SZ 6 PF LATEX - (50EA/BX 4BX/CA)

## (undated) DEVICE — GRAFT MESH SEPRAFILM - 10/BX

## (undated) DEVICE — KIT ROOM DECONTAMINATION

## (undated) DEVICE — GOWN SURGICAL X-LARGE ULTRA - FILM-REINFORCED (20/CA)

## (undated) DEVICE — BANDAGE ROLL STERILE BULKEE 4.5 IN X 4 YD (100EA/CA)

## (undated) DEVICE — GOWN WARMING STANDARD FLEX - (30/CA)

## (undated) DEVICE — PROTECTOR ULNA NERVE - (36PR/CA)

## (undated) DEVICE — DRAIN CSF WITH ANTI REFLUX VALVE

## (undated) DEVICE — TUBING CLEARLINK DUO-VENT - C-FLO (48EA/CA)

## (undated) DEVICE — BLANKET WARMING LOWER BODY - (10/CA) INACTIVE USE #8585

## (undated) DEVICE — MASK ANESTHESIA ADULT  - (100/CA)

## (undated) DEVICE — MIDAS LUBRICATOR/DIFFUSER PACK - 4/CS

## (undated) DEVICE — ELECTRODE DUAL RETURN W/ CORD - (50/PK)

## (undated) DEVICE — SUTURE GENERAL

## (undated) DEVICE — SPONGE GAUZESTER 4 X 4 4PLY - (128PK/CA)

## (undated) DEVICE — LEAD SET 6 DISP. EKG NIHON KOHDEN

## (undated) DEVICE — TUBE E-T HI-LO CUFF 7.5MM (10EA/PK)

## (undated) DEVICE — PERFORATER DISP TIP DGR-0

## (undated) DEVICE — SET LEADWIRE 5 LEAD BEDSIDE DISPOSABLE ECG (1SET OF 5/EA)

## (undated) DEVICE — BLADE CLIPPER FITS 2501 CLIPPER (BLUE)  (20EA/CA)

## (undated) DEVICE — SENSOR SPO2 NEO LNCS ADHESIVE (20/BX) SEE USER NOTES

## (undated) DEVICE — SCALP CLIP RANEY 20-1037 (10EA/PK 20PK/CA)

## (undated) DEVICE — GLOVE BIOGEL SZ 7 SURGICAL PF LTX - (50PR/BX 4BX/CA)

## (undated) DEVICE — SUTURE 3-0 VICRYL PLUS SH - 8X 18 INCH (12/BX)

## (undated) DEVICE — LACTATED RINGERS INJ 1000 ML - (14EA/CA 60CA/PF)

## (undated) DEVICE — CORDS BIPOLAR COAGULATION - 12FT STERILE DISP. (10EA/BX)

## (undated) DEVICE — CANISTER SUCTION 3000ML MECHANICAL FILTER AUTO SHUTOFF MEDI-VAC NONSTERILE LF DISP  (40EA/CA)

## (undated) DEVICE — CHLORAPREP 26 ML APPLICATOR - ORANGE TINT(25/CA)

## (undated) DEVICE — SUTURE 4-0 PROLENE PS-2 18 (36PK/BX)"

## (undated) DEVICE — TOOL DISSECT MATCH HEAD

## (undated) DEVICE — GLOVE BIOGEL PI INDICATOR SZ 7.5 SURGICAL PF LF -(50/BX 4BX/CA)

## (undated) DEVICE — GOWN SURGEONS X-LARGE - DISP. (30/CA)

## (undated) DEVICE — KIT RADIAL ARTERY 20GA W/MAX BARRIER AND BIOPATCH  (5EA/CA) #10740 IS FOR THE SET RADIAL ARTERIAL

## (undated) DEVICE — DRAPE STRLE REG TOWEL 18X24 - (10/BX 4BX/CA)"

## (undated) DEVICE — DETERGENT RENUZYME PLUS 10 OZ PACKET (50/BX)

## (undated) DEVICE — SUTURE 2-0 ETHILON FS - (36/BX) 18 INCH

## (undated) DEVICE — GLOVE BIOGEL INDICATOR SZ 7SURGICAL PF LTX - (50/BX 4BX/CA)

## (undated) DEVICE — GLOVE BIOGEL INDICATOR SZ 7.5 SURGICAL PF LTX - (50PR/BX 4BX/CA)

## (undated) DEVICE — SYRINGE SAFETY 5 ML 18 GA X 1-1/2 BLUNT LL (100/BX 4BX/CA)

## (undated) DEVICE — LACTATED RINGERS INJ. 500 ML - (24EA/CA)

## (undated) DEVICE — WATER IRRIG. STER. 1500 ML - (9/CA)

## (undated) DEVICE — GLOVE BIOGEL SZ 6.5 SURGICAL PF LTX (50PR/BX 4BX/CA)

## (undated) DEVICE — NEPTUNE 4 PORT MANIFOLD - (20/PK)

## (undated) DEVICE — SODIUM CHL IRRIGATION 0.9% 1000ML (12EA/CA)

## (undated) DEVICE — DRESSING NON ADHERENT 3 X 4 - STERILE (100/BX 12BX/CA)

## (undated) DEVICE — STAPLER SKIN DISP - (6/BX 10BX/CA) VISISTAT

## (undated) DEVICE — PACK CRANI - (1EA/CA)

## (undated) DEVICE — SUCTION INSTRUMENT YANKAUER BULBOUS TIP W/O VENT (50EA/CA)

## (undated) DEVICE — GLOVE BIOGEL ECLIPSE PF LATEX SIZE 8.0  (50PR/BX)